# Patient Record
Sex: FEMALE | Race: WHITE | NOT HISPANIC OR LATINO | Employment: FULL TIME | URBAN - METROPOLITAN AREA
[De-identification: names, ages, dates, MRNs, and addresses within clinical notes are randomized per-mention and may not be internally consistent; named-entity substitution may affect disease eponyms.]

---

## 2017-02-06 ENCOUNTER — APPOINTMENT (EMERGENCY)
Dept: RADIOLOGY | Facility: HOSPITAL | Age: 37
End: 2017-02-06
Payer: COMMERCIAL

## 2017-02-06 ENCOUNTER — HOSPITAL ENCOUNTER (EMERGENCY)
Facility: HOSPITAL | Age: 37
Discharge: HOME | End: 2017-02-06
Attending: EMERGENCY MEDICINE | Admitting: EMERGENCY MEDICINE
Payer: COMMERCIAL

## 2017-02-06 VITALS
RESPIRATION RATE: 18 BRPM | BODY MASS INDEX: 30.73 KG/M2 | TEMPERATURE: 97.5 F | HEART RATE: 78 BPM | DIASTOLIC BLOOD PRESSURE: 75 MMHG | HEIGHT: 64 IN | WEIGHT: 180 LBS | OXYGEN SATURATION: 98 % | SYSTOLIC BLOOD PRESSURE: 122 MMHG

## 2017-02-06 DIAGNOSIS — M54.50 LOW BACK PAIN: Primary | ICD-10-CM

## 2017-02-06 LAB
ALBUMIN SERPL BCP-MCNC: 3.8 G/DL (ref 3.5–5)
ALP SERPL-CCNC: 89 U/L (ref 46–116)
ALT SERPL W P-5'-P-CCNC: 27 U/L (ref 12–78)
ANION GAP SERPL CALCULATED.3IONS-SCNC: 10 MMOL/L (ref 4–13)
AST SERPL W P-5'-P-CCNC: 18 U/L (ref 5–45)
BASOPHILS # BLD AUTO: 0.07 THOUSANDS/ΜL (ref 0–0.1)
BASOPHILS NFR BLD AUTO: 1 % (ref 0–1)
BILIRUB SERPL-MCNC: 0.6 MG/DL (ref 0.2–1)
BILIRUB UR QL STRIP: NEGATIVE
BUN SERPL-MCNC: 16 MG/DL (ref 5–25)
CALCIUM SERPL-MCNC: 9.1 MG/DL (ref 8.3–10.1)
CHLORIDE SERPL-SCNC: 100 MMOL/L (ref 100–108)
CLARITY UR: CLEAR
CO2 SERPL-SCNC: 28 MMOL/L (ref 21–32)
COLOR UR: YELLOW
CREAT SERPL-MCNC: 0.78 MG/DL (ref 0.6–1.3)
EOSINOPHIL # BLD AUTO: 0.02 THOUSAND/ΜL (ref 0–0.61)
EOSINOPHIL NFR BLD AUTO: 0 % (ref 0–6)
ERYTHROCYTE [DISTWIDTH] IN BLOOD BY AUTOMATED COUNT: 12.2 % (ref 11.6–15.1)
GFR SERPL CREATININE-BSD FRML MDRD: >60 ML/MIN/1.73SQ M
GLUCOSE SERPL-MCNC: 84 MG/DL (ref 65–140)
GLUCOSE UR STRIP-MCNC: NEGATIVE MG/DL
HCG UR QL: NEGATIVE
HCT VFR BLD AUTO: 39.7 % (ref 34.8–46.1)
HGB BLD-MCNC: 13.3 G/DL (ref 11.5–15.4)
HGB UR QL STRIP.AUTO: NEGATIVE
KETONES UR STRIP-MCNC: ABNORMAL MG/DL
LEUKOCYTE ESTERASE UR QL STRIP: NEGATIVE
LYMPHOCYTES # BLD AUTO: 2.2 THOUSANDS/ΜL (ref 0.6–4.47)
LYMPHOCYTES NFR BLD AUTO: 35 % (ref 14–44)
MCH RBC QN AUTO: 28.3 PG (ref 26.8–34.3)
MCHC RBC AUTO-ENTMCNC: 33.5 G/DL (ref 31.4–37.4)
MCV RBC AUTO: 85 FL (ref 82–98)
MONOCYTES # BLD AUTO: 0.46 THOUSAND/ΜL (ref 0.17–1.22)
MONOCYTES NFR BLD AUTO: 7 % (ref 4–12)
NEUTROPHILS # BLD AUTO: 3.46 THOUSANDS/ΜL (ref 1.85–7.62)
NEUTS SEG NFR BLD AUTO: 56 % (ref 43–75)
NITRITE UR QL STRIP: NEGATIVE
NRBC BLD AUTO-RTO: 0 /100 WBCS
PH UR STRIP.AUTO: 5.5 [PH] (ref 4.5–8)
PLATELET # BLD AUTO: 244 THOUSANDS/UL (ref 149–390)
PMV BLD AUTO: 10.1 FL (ref 8.9–12.7)
POTASSIUM SERPL-SCNC: 3.6 MMOL/L (ref 3.5–5.3)
PROT SERPL-MCNC: 8.1 G/DL (ref 6.4–8.2)
PROT UR STRIP-MCNC: NEGATIVE MG/DL
RBC # BLD AUTO: 4.7 MILLION/UL (ref 3.81–5.12)
SODIUM SERPL-SCNC: 138 MMOL/L (ref 136–145)
SP GR UR STRIP.AUTO: 1.02 (ref 1–1.03)
UROBILINOGEN UR QL STRIP.AUTO: 0.2 E.U./DL
WBC # BLD AUTO: 6.22 THOUSAND/UL (ref 4.31–10.16)

## 2017-02-06 PROCEDURE — 85025 COMPLETE CBC W/AUTO DIFF WBC: CPT | Performed by: EMERGENCY MEDICINE

## 2017-02-06 PROCEDURE — 80053 COMPREHEN METABOLIC PANEL: CPT | Performed by: EMERGENCY MEDICINE

## 2017-02-06 PROCEDURE — A9270 NON-COVERED ITEM OR SERVICE: HCPCS | Performed by: EMERGENCY MEDICINE

## 2017-02-06 PROCEDURE — 72100 X-RAY EXAM L-S SPINE 2/3 VWS: CPT

## 2017-02-06 PROCEDURE — 99283 EMERGENCY DEPT VISIT LOW MDM: CPT

## 2017-02-06 PROCEDURE — 36415 COLL VENOUS BLD VENIPUNCTURE: CPT | Performed by: EMERGENCY MEDICINE

## 2017-02-06 PROCEDURE — 81003 URINALYSIS AUTO W/O SCOPE: CPT | Performed by: EMERGENCY MEDICINE

## 2017-02-06 PROCEDURE — 81025 URINE PREGNANCY TEST: CPT | Performed by: EMERGENCY MEDICINE

## 2017-02-06 RX ORDER — NAPROXEN 250 MG/1
500 TABLET ORAL ONCE
Status: COMPLETED | OUTPATIENT
Start: 2017-02-06 | End: 2017-02-06

## 2017-02-06 RX ORDER — NAPROXEN 500 MG/1
500 TABLET ORAL 2 TIMES DAILY WITH MEALS
Qty: 14 TABLET | Refills: 0 | Status: SHIPPED | OUTPATIENT
Start: 2017-02-06 | End: 2018-10-11

## 2017-02-06 RX ADMIN — NAPROXEN 500 MG: 250 TABLET ORAL at 07:19

## 2017-07-26 ENCOUNTER — TRANSCRIBE ORDERS (OUTPATIENT)
Dept: ADMINISTRATIVE | Facility: HOSPITAL | Age: 37
End: 2017-07-26

## 2017-07-26 ENCOUNTER — APPOINTMENT (OUTPATIENT)
Dept: LAB | Facility: HOSPITAL | Age: 37
End: 2017-07-26
Payer: COMMERCIAL

## 2017-07-26 DIAGNOSIS — Z00.8 HEALTH EXAMINATION IN POPULATION SURVEY: Primary | ICD-10-CM

## 2017-07-26 DIAGNOSIS — Z00.8 HEALTH EXAMINATION IN POPULATION SURVEY: ICD-10-CM

## 2017-07-26 LAB
CHOLEST SERPL-MCNC: 179 MG/DL (ref 50–200)
EST. AVERAGE GLUCOSE BLD GHB EST-MCNC: 114 MG/DL
HBA1C MFR BLD: 5.6 % (ref 4.2–6.3)
HDLC SERPL-MCNC: 52 MG/DL (ref 40–60)
LDLC SERPL CALC-MCNC: 108 MG/DL (ref 0–100)
TRIGL SERPL-MCNC: 96 MG/DL

## 2017-07-26 PROCEDURE — 36415 COLL VENOUS BLD VENIPUNCTURE: CPT

## 2017-07-26 PROCEDURE — 83036 HEMOGLOBIN GLYCOSYLATED A1C: CPT

## 2017-07-26 PROCEDURE — 80061 LIPID PANEL: CPT

## 2017-08-15 ENCOUNTER — ALLSCRIPTS OFFICE VISIT (OUTPATIENT)
Dept: OTHER | Facility: OTHER | Age: 37
End: 2017-08-15

## 2017-12-20 ENCOUNTER — ALLSCRIPTS OFFICE VISIT (OUTPATIENT)
Dept: OTHER | Facility: OTHER | Age: 37
End: 2017-12-20

## 2017-12-20 DIAGNOSIS — R53.81 OTHER MALAISE: ICD-10-CM

## 2017-12-20 DIAGNOSIS — F41.9 ANXIETY DISORDER: ICD-10-CM

## 2017-12-20 DIAGNOSIS — M25.50 PAIN IN JOINT: ICD-10-CM

## 2017-12-20 DIAGNOSIS — R53.83 OTHER FATIGUE: ICD-10-CM

## 2017-12-21 NOTE — PROGRESS NOTES
Assessment   1  Arthralgia of multiple joints (719 49) (M25 50)   2  Malaise and fatigue (780 79) (R53 81,R53 83)   3  Anxiety (300 00) (F41 9)    Plan   Anxiety    · Escitalopram Oxalate 10 MG Oral Tablet (Lexapro); take 1 tablet every day  Anxiety, Arthralgia of multiple joints, Malaise and fatigue    · (1) SHIRAZ SCREEN W/REFLEX TO TITER/PATTERN; Status:Active; Requested    for:13Gxw3498;    · (1) CBC/PLT/DIFF; Status:Active; Requested for:14Ams9798;    · (1) COMPREHENSIVE METABOLIC PANEL; Status:Active; Requested for:97Vwr2092;    · (1) C-REACTIVE PROTEIN; Status:Active; Requested for:09Ciq8948;    · (1) JONNA BARR VIRUS; Status:Active; Requested for:90Wsz3488;    · (1) LYME ANTIBODY PROFILE W/REFLEX TO WESTERN BLOT; Status:Active; Requested    for:98Fpf3873;    · (1) RHEUMATOID FACTOR SCREEN; Status:Active; Requested for:71Rbl5207;    · (1) SED RATE; Status:Active; Requested for:58Mdc7479;    · (1) T3 TOTAL; Status:Active; Requested for:25Iwy7601;    · (1) T4, FREE; Status:Active; Requested for:55Ryx7250;    · (1) TSH; Status:Active; Requested for:31Mwg6739;     Discussion/Summary      Lexapro is renewed  Patient is to have lab work done as ordered for her fatigue  We will follow up after labs are done  The patient was counseled regarding instructions for management,-- risk factor reductions,-- impressions,-- risks and benefits of treatment options,-- importance of compliance with treatment  Possible side effects of new medications were reviewed with the patient/guardian today  The treatment plan was reviewed with the patient/guardian  The patient/guardian understands and agrees with the treatment plan       Self Referrals: No      Chief Complaint   Pt in office today for a f/u on medications and is asking for a blood order  Pt reports fatigue and joint pain  Patient is here today for follow up of chronic conditions described in HPI        History of Present Illness   30year-old here for follow-up of her anxiety and medication  She feels she is doing well on the Lexapro  She would like to continue  She does note that she is having more fatigue than usual  She works night shifts and knows what her normal tired is in this is just so much worse  She has joint achiness along with the fatigue  The patient is being seen for follow-up of generalized anxiety disorder  The patient reports doing well  She has no comorbid illnesses  The patient is currently asymptomatic  Medications:  the patient is adherent to her medication regimen, but-- she denies medication side effects  The patient is being seen for an initial evaluation of fatigue  Symptoms:  fatigue, but-- no generalized weakness  The patient is currently experiencing symptoms  Associated symptoms:  poor sleep,-- somnolence,-- anxiety-- and-- arthralgias, but-- no cognitive impairment,-- no depressed mood,-- no myalgias,-- no fever,-- no swollen glands,-- no cough,-- no dyspnea,-- no irregular heartbeat,-- no chest pain,-- no abdominal pain,-- no nausea,-- no vomiting,-- no diarrhea,-- no constipation,-- no menstrual irregularity,-- no change in weight,-- no heat intolerance,-- no cold intolerance,-- no polyuria-- and-- no polydipsia  Review of Systems        Constitutional: as noted in HPI       ENT: no complaints of earache, no loss of hearing, no nose bleeds, no nasal discharge, no sore throat, no hoarseness  Cardiovascular: No complaints of slow heart rate, no fast heart rate, no chest pain, no palpitations, no leg claudication, no lower extremity edema  Respiratory: No complaints of shortness of breath, no wheezing, no cough, no SOB on exertion, no orthopnea, no PND  Gastrointestinal: No complaints of abdominal pain, no constipation, no nausea or vomiting, no diarrhea, no bloody stools  Musculoskeletal: as noted in HPI        Integumentary: No complaints of skin rash or lesions, no itching, no skin wounds, no breast pain or lump       Neurological: No complaints of headache, no confusion, no convulsions, no numbness, no dizziness or fainting, no tingling, no limb weakness, no difficulty walking  Psychiatric: as noted in HPI  ROS reviewed  Active Problems   1  Anxiety (300 00) (F41 9)   2  Mild cervical dysplasia (622 11) (N87 0)    Past Medical History   1  History of Diet controlled gestational diabetes mellitus in third trimester (648 83)     (O24 410)   2  History of Encounter for postpartum assessment (V24 2) (Z39 2)   3  History of Encounter for supervision of other normal pregnancy in third trimester (V22 1)     (Z34 83)   4  History of Gestational diabetes mellitus, class A1 (648 80) (O24 410)   5  History of female infertility (V13 29) (Z87 42)   6  History of HPV test positive (796 9,079 4)   7  History of Need for Tdap vaccination (V06 1) (Z23)   8  Normal delivery (650) (O80,Z37 9)   9  History of Obesity complicating pregnancy, childbirth, or puerperium, antepartum     (649 13,278 00) (O99 210,E66 9)   10  History of Pregnancy complicated by obesity (289 81) (O99 210)     The active problems and past medical history were reviewed and updated today  Surgical History   1  History of Colposcopy Cervix With Biopsy(S)   2  History of Hysteroscopy With Bilateral Fallopian Tube Cannulation   3  History of Oral Surgery Tooth Extraction     The surgical history was reviewed and updated today  Family History   Father    1  Family history of Alcoholism  Maternal Grandmother    2  Family history of malignant neoplasm of uterus (V16 49) (Z80 49)  Paternal Grandmother    3  Family history of Breast Cancer (V16 3)  Paternal Grandfather    4  Family history of Liver Cancer  Paternal Aunt    11  Family history of Alcoholism  Maternal Uncle    6  Family history of Type 2 Diabetes Mellitus - Uncomplicated, Controlled  Family History    7   Family history of Reported Family History Of Heart Disease     The family history was reviewed and updated today  Social History    · Being A Social Drinker   · Full-time employment   · Marital History - Currently    · Never A Smoker   · Uses Safety Equipment - Seatbelts  The social history was reviewed and is unchanged  Current Meds    1  Escitalopram Oxalate 10 MG Oral Tablet; take 1 tablet every day; Therapy: 53JMJ2565 to (Evaluate:91Xhl2410)  Requested for: 04DMI0857; Last     Rx:12Oct2017; Status: ACTIVE - Renewal Denied Ordered     The medication list was reviewed and updated today  Allergies   1  No Known Drug Allergies  2  No Known Environmental Allergies   3  No Known Food Allergies    Vitals   Vital Signs    Recorded: 20Dec2017 10:28AM   Heart Rate 80   Systolic 959   Diastolic 88   Height 5 ft 4 in   Weight 182 lb    BMI Calculated 31 24   BSA Calculated 1 88   O2 Saturation 99     Physical Exam        Constitutional      General appearance: No acute distress, well appearing and well nourished  Ears, Nose, Mouth, and Throat      External inspection of ears and nose: Normal        Otoscopic examination: Tympanic membranes translucent with normal light reflex  Canals patent without erythema  Oropharynx: Normal with no erythema, edema, exudate or lesions  Pulmonary      Respiratory effort: No increased work of breathing or signs of respiratory distress  Auscultation of lungs: Clear to auscultation  Cardiovascular      Auscultation of heart: Normal rate and rhythm, normal S1 and S2, without murmurs  Carotid pulses: Normal        Lymphatic      Palpation of lymph nodes in neck: No lymphadenopathy  Musculoskeletal      Gait and station: Normal        Inspection/palpation of joints, bones, and muscles: Normal        Psychiatric      Orientation to person, place, and time: Normal        Mood and affect: Normal           Health Management   Mild cervical dysplasia   (1) THIN PREP PAP WITH IMAGING; every 6 months;  Last 97Vkr9873; Next Due: 51BTS8359;     Overdue    Signatures    Electronically signed by : TULIO Vazquez; Dec 20 2017 10:57AM EST                       (Author)     Electronically signed by : HADLEY Oliva ; Dec 21 2017 12:04AM EST                           Electronically signed by : HADLEY Oliva ; Dec 21 2017 12:04AM EST                           Electronically signed by : HADLEY Oliva ; Dec 21 2017 12:04AM EST                           Electronically signed by : HADLEY Oliva ; Dec 21 2017 12:04AM EST                           Electronically signed by : HADLEY Oliva ; Dec 21 2017 12:04AM EST                           Electronically signed by : HADLEY Oliva ; Dec 21 2017 12:42AM EST

## 2017-12-22 ENCOUNTER — TRANSCRIBE ORDERS (OUTPATIENT)
Dept: ADMINISTRATIVE | Facility: HOSPITAL | Age: 37
End: 2017-12-22

## 2017-12-22 ENCOUNTER — APPOINTMENT (OUTPATIENT)
Dept: LAB | Facility: HOSPITAL | Age: 37
End: 2017-12-22
Payer: COMMERCIAL

## 2017-12-22 DIAGNOSIS — F41.9 ANXIETY DISORDER: ICD-10-CM

## 2017-12-22 DIAGNOSIS — R53.83 OTHER FATIGUE: ICD-10-CM

## 2017-12-22 DIAGNOSIS — M25.50 PAIN IN JOINT: ICD-10-CM

## 2017-12-22 DIAGNOSIS — R53.81 OTHER MALAISE: ICD-10-CM

## 2017-12-22 LAB
ALBUMIN SERPL BCP-MCNC: 3.8 G/DL (ref 3.5–5)
ALP SERPL-CCNC: 82 U/L (ref 46–116)
ALT SERPL W P-5'-P-CCNC: 38 U/L (ref 12–78)
ANION GAP SERPL CALCULATED.3IONS-SCNC: 9 MMOL/L (ref 4–13)
AST SERPL W P-5'-P-CCNC: 20 U/L (ref 5–45)
BASOPHILS # BLD AUTO: 0.1 THOUSANDS/ΜL (ref 0–0.1)
BASOPHILS NFR BLD AUTO: 2 % (ref 0–1)
BILIRUB SERPL-MCNC: 0.4 MG/DL (ref 0.2–1)
BUN SERPL-MCNC: 20 MG/DL (ref 5–25)
CALCIUM SERPL-MCNC: 9.3 MG/DL (ref 8.3–10.1)
CHLORIDE SERPL-SCNC: 101 MMOL/L (ref 100–108)
CO2 SERPL-SCNC: 30 MMOL/L (ref 21–32)
CREAT SERPL-MCNC: 0.72 MG/DL (ref 0.6–1.3)
CRP SERPL QL: 4.3 MG/L
EOSINOPHIL # BLD AUTO: 0.06 THOUSAND/ΜL (ref 0–0.61)
EOSINOPHIL NFR BLD AUTO: 1 % (ref 0–6)
ERYTHROCYTE [DISTWIDTH] IN BLOOD BY AUTOMATED COUNT: 12.1 % (ref 11.6–15.1)
ERYTHROCYTE [SEDIMENTATION RATE] IN BLOOD: 32 MM/HOUR (ref 0–20)
GFR SERPL CREATININE-BSD FRML MDRD: 109 ML/MIN/1.73SQ M
GLUCOSE P FAST SERPL-MCNC: 96 MG/DL (ref 65–99)
HCT VFR BLD AUTO: 38.6 % (ref 34.8–46.1)
HGB BLD-MCNC: 13 G/DL (ref 11.5–15.4)
LYMPHOCYTES # BLD AUTO: 2.86 THOUSANDS/ΜL (ref 0.6–4.47)
LYMPHOCYTES NFR BLD AUTO: 44 % (ref 14–44)
MCH RBC QN AUTO: 28.8 PG (ref 26.8–34.3)
MCHC RBC AUTO-ENTMCNC: 33.7 G/DL (ref 31.4–37.4)
MCV RBC AUTO: 86 FL (ref 82–98)
MONOCYTES # BLD AUTO: 0.91 THOUSAND/ΜL (ref 0.17–1.22)
MONOCYTES NFR BLD AUTO: 14 % (ref 4–12)
NEUTROPHILS # BLD AUTO: 2.49 THOUSANDS/ΜL (ref 1.85–7.62)
NEUTS SEG NFR BLD AUTO: 38 % (ref 43–75)
NRBC BLD AUTO-RTO: 0 /100 WBCS
PLATELET # BLD AUTO: 283 THOUSANDS/UL (ref 149–390)
PMV BLD AUTO: 9.7 FL (ref 8.9–12.7)
POTASSIUM SERPL-SCNC: 4 MMOL/L (ref 3.5–5.3)
PROT SERPL-MCNC: 8.4 G/DL (ref 6.4–8.2)
RBC # BLD AUTO: 4.51 MILLION/UL (ref 3.81–5.12)
SODIUM SERPL-SCNC: 140 MMOL/L (ref 136–145)
T3 SERPL-MCNC: 1.2 NG/ML (ref 0.6–1.8)
T4 FREE SERPL-MCNC: 0.97 NG/DL (ref 0.76–1.46)
TSH SERPL DL<=0.05 MIU/L-ACNC: 1.68 UIU/ML (ref 0.36–3.74)
WBC # BLD AUTO: 6.51 THOUSAND/UL (ref 4.31–10.16)

## 2017-12-22 PROCEDURE — 80053 COMPREHEN METABOLIC PANEL: CPT

## 2017-12-22 PROCEDURE — 36415 COLL VENOUS BLD VENIPUNCTURE: CPT

## 2017-12-22 PROCEDURE — 84439 ASSAY OF FREE THYROXINE: CPT

## 2017-12-22 PROCEDURE — 86618 LYME DISEASE ANTIBODY: CPT

## 2017-12-22 PROCEDURE — 86140 C-REACTIVE PROTEIN: CPT

## 2017-12-22 PROCEDURE — 84480 ASSAY TRIIODOTHYRONINE (T3): CPT

## 2017-12-22 PROCEDURE — 86665 EPSTEIN-BARR CAPSID VCA: CPT

## 2017-12-22 PROCEDURE — 86038 ANTINUCLEAR ANTIBODIES: CPT

## 2017-12-22 PROCEDURE — 86430 RHEUMATOID FACTOR TEST QUAL: CPT

## 2017-12-22 PROCEDURE — 85025 COMPLETE CBC W/AUTO DIFF WBC: CPT

## 2017-12-22 PROCEDURE — 85652 RBC SED RATE AUTOMATED: CPT

## 2017-12-22 PROCEDURE — 86664 EPSTEIN-BARR NUCLEAR ANTIGEN: CPT

## 2017-12-22 PROCEDURE — 86663 EPSTEIN-BARR ANTIBODY: CPT

## 2017-12-22 PROCEDURE — 84443 ASSAY THYROID STIM HORMONE: CPT

## 2017-12-23 LAB
EBV EA IGG SER-ACNC: 21.8 U/ML (ref 0–8.9)
EBV NA IGG SER IA-ACNC: 38.8 U/ML (ref 0–17.9)
EBV PATRN SPEC IB-IMP: ABNORMAL
EBV VCA IGG SER IA-ACNC: 170 U/ML (ref 0–17.9)
EBV VCA IGM SER IA-ACNC: <36 U/ML (ref 0–35.9)

## 2017-12-26 LAB
B BURGDOR IGG SER IA-ACNC: 0.22
B BURGDOR IGM SER IA-ACNC: 0.38
RHEUMATOID FACT SER QL LA: NEGATIVE

## 2017-12-27 LAB — RYE IGE QN: NEGATIVE

## 2018-01-10 NOTE — PROGRESS NOTES
Discussion/Summary  Discussion Summary:   Please refer to the ultrasound report for additional information  Counseling Documentation With Imm: The patient was counseled regarding diagnostic results, instructions for management, prognosis, impressions  Chief Complaint  Chief Complaint Free Text Note Form: Here for ultrasound study      History of Present Illness  HPI: Please refer to the ultrasound report for additional information  Active Problems    1  Encounter for supervision of other normal pregnancy in third trimester (V22 1) (Z34 83)   2  Gestational diabetes mellitus, class A1 (648 80) (O24 410)   3  Mild cervical dysplasia (622 11) (N87 0)   4  Pregnancy complicated by obesity (864 77,449 77) (O99 210,E66 9)    Past Medical History    1  History of female infertility (V13 29) (Z87 42)   2  History of HPV test positive (796 9,079 4) (R88 8,B97 7)   3  Normal delivery (650) (O80,Z37 9)   4  History of Obesity complicating pregnancy, childbirth, or puerperium, antepartum   (649 13,278 00) (O99 210,E66 9)    Surgical History    1  History of Colposcopy Cervix With Biopsy(S)   2  History of Hysteroscopy With Bilateral Fallopian Tube Cannulation   3  History of Oral Surgery Tooth Extraction    Family History    1  Family history of Alcoholism    2  Family history of malignant neoplasm of uterus (V16 49) (Z80 49)    3  Family history of Breast Cancer (V16 3)    4  Family history of Liver Cancer    5  Family history of Alcoholism    6  Family history of Type 2 Diabetes Mellitus - Uncomplicated, Controlled    7  Family history of Reported Family History Of Heart Disease    Social History    · Being A Social Drinker   · Full-time employment   · Marital History - Currently    · Never A Smoker   · Uses Safety Equipment - Seatbelts    Current Meds   1  Karlo Contour Next Monitor w/Device Kit; USE AS DIRECTED; Therapy: 72JCV8230 to (Evaluate:88Szk1955)  Requested for: 21Jan2014;  Last   LA:71JUR3760 Ordered   2  Karlo Contour Next Test In [employer]; USE 1 STRIP 4 TIMES DAILY; Therapy: 58CMX3638 to (Evaluate:2016)  Requested for: 12YUS4000; Last   Rx:59Ntn3478 Ordered   3  Breast Pump Miscellaneous; USE AS DIRECTED; Therapy: 05Jiu5359 to Recorded   4  Lancets Miscellaneous; TEST 4 TIMES DAILY; Therapy: 26GXC4119 to (Evaluate:41Ieb3311)  Requested for: 2014; Last   Rx:2014 Ordered   5  Prenatal 28-0 8 MG Oral Tablet; TAKE 1 TABLET DAILY; Therapy: 06Jts0091 to Recorded    Allergies    1  No Known Drug Allergies    2  No Known Environmental Allergies   3  No Known Food Allergies    Vitals  Vital Signs [Data Includes: Current Encounter]    Recorded: 62JHL2610 27:32VT   Systolic 376, LUE, Sitting   Diastolic 75, LUE, Sitting   BP Cuff Size Large   Height 5 ft 4 in   Weight 187 lb 3 2 oz   BMI Calculated 32 13   BSA Calculated 1 9   Pain Scale 0     Results/Data  Obstetrical Ultrasound: Exam description: level I obstetrical ultrasound  Findings: no fetal abnormalities seen  Health Management  Mild cervical dysplasia   (1) THIN PREP PAP WITH IMAGING; every 6 months; Last 2013; Next Due: 53YNP3515; Overdue    Future Appointments    Date/Time Provider Specialty Site   2016 02:20 PM HADLEY Chris  Obstetrics/Gynecology [facility]   2016 02:40 PM HADLEY Chris  Obstetrics/Gynecology [facility]   2016 01:30 PM  [address] [address], [facility]   2016 03:30 PM Ronit Suggs MD Obstetrics/Gynecology [facility]   2016 03:00 PM Ronit Suggs MD Obstetrics/Gynecology [facility]   2016 03:00 PM Ronit Suggs MD Obstetrics/Gynecology [facility]   2016 02:40 PM HADLEY Bo   Obstetrics/Gynecology [facility]     Signatures   Electronically signed by : HADLEY Blake ;  2016  2:02PM EST                       (Author)

## 2018-01-11 NOTE — MISCELLANEOUS
Message  Return to work or school:   Vanna Gilford is under my professional care  She was seen in my office on 4/15/16   She is able to return to work on  5/23/16    She is able to perform activities of daily living without limitations  Weight Bearing Status: Weight-Bearing As Tolerated  Letty Hardwick may return to work with no restrictions at this time          Signatures   Electronically signed by : Colleen Dominguez, ; Jun 1 2016 11:00AM EST                       (/Recorder)

## 2018-01-13 VITALS
HEIGHT: 64 IN | SYSTOLIC BLOOD PRESSURE: 112 MMHG | BODY MASS INDEX: 30.77 KG/M2 | WEIGHT: 180.25 LBS | OXYGEN SATURATION: 98 % | DIASTOLIC BLOOD PRESSURE: 82 MMHG | HEART RATE: 113 BPM

## 2018-01-15 NOTE — CONSULTS
I had the pleasure of evaluating your patient, Sanam Shelley  My full evaluation follows:      Chief Complaint  Here for ultrasound study      History of Present Illness  Please refer to the ultrasound report for additional information  Active Problems    1  Encounter for supervision of other normal pregnancy in third trimester (V22 1) (Z34 83)   2  Gestational diabetes mellitus, class A1 (648 80) (O24 410)   3  Mild cervical dysplasia (622 11) (N87 0)   4  Pregnancy complicated by obesity (686 98,056 91) (O99 210,E66 9)    Past Medical History    · History of female infertility (V13 29) (Z87 42)   · History of HPV test positive (796 9,079 4) (R88 8,B97 7)   · Normal delivery (650) (O80,Z37  9)   · History of Obesity complicating pregnancy, childbirth, or puerperium, antepartum  (649 13,278 00) (O99 210,E66 9)    Surgical History    · History of Colposcopy Cervix With Biopsy(S)   · History of Hysteroscopy With Bilateral Fallopian Tube Cannulation   · History of Oral Surgery Tooth Extraction    Family History    · Family history of Alcoholism    · Family history of malignant neoplasm of uterus (V16 49) (Z80 49)    · Family history of Breast Cancer (V16 3)    · Family history of Liver Cancer    · Family history of Alcoholism    · Family history of Type 2 Diabetes Mellitus - Uncomplicated, Controlled    · Family history of Reported Family History Of Heart Disease    Social History    · Being A Social Drinker   · Full-time employment   · Marital History - Currently    · Never A Smoker   · Uses Safety Equipment - Seatbelts    Current Meds   1  Karlo Contour Next Monitor w/Device Kit; USE AS DIRECTED; Therapy: 29OYL7758 to (Evaluate:23Xbi0036)  Requested for: 21Jan2014; Last   Rx:21Jan2014 Ordered   2  Karlo Contour Next Test In [employer]; USE 1 STRIP 4 TIMES DAILY; Therapy: 06ZPK0681 to (Evaluate:22Apr2016)  Requested for: 35QXV9106; Last   Rx:05Oct2015 Ordered   3   Breast Pump Miscellaneous; USE AS DIRECTED; Therapy: 08Aug2014 to Recorded   4  Lancets Miscellaneous; TEST 4 TIMES DAILY; Therapy: 76FME7554 to (Evaluate:09Aug2014)  Requested for: 21Jan2014; Last   Rx:21Jan2014 Ordered   5  Prenatal 28-0 8 MG Oral Tablet; TAKE 1 TABLET DAILY; Therapy: 23Dec2013 to Recorded    Allergies    1  No Known Drug Allergies    2  No Known Environmental Allergies   3  No Known Food Allergies    Vitals   Recorded: 26JWC9141 74:52LR   Systolic 105, LUE, Sitting   Diastolic 75, LUE, Sitting   BP Cuff Size Large   Height 5 ft 4 in   Weight 187 lb 3 2 oz   BMI Calculated 32 13   BSA Calculated 1 9   Pain Scale 0     Results/Data  Exam description: level I obstetrical ultrasound  Findings: no fetal abnormalities seen  Discussion/Summary    Please refer to the ultrasound report for additional information  The patient was counseled regarding diagnostic results, instructions for management, prognosis, impressions  Health Management   (1) THIN PREP PAP WITH IMAGING; every 6 months; Last 28Aug2013; Next Due: 93ZDA7901; Overdue    Thank you very much for allowing me to participate in the care of this patient  If you have any questions, please do not hesitate to contact me        Future Appointments    Signatures   Electronically signed by : HADLEY Madison ; Jan 29 2016  2:02PM EST                       (Author)

## 2018-01-16 NOTE — PROGRESS NOTES
2016         RE: Monet Cabrera                               To: [facility]  MR#: [id number]                                   [address]   : [address]   ENC: [id number]                             Maximino, [address]   (Exam #: [id number])                           Fax: [phone]      The LMP of this 37year old,  3, para 2 patient was unknown, her   working ALFRED is MAR 20 2016 and the current gestational age is 42 weeks 4   days by 1st Trimester Sono  A sonographic examination was performed on 2016 using real time equipment  The ultrasound examination was   performed using abdominal technique  The patient has a BMI of 32 4  Her   blood pressure today was 124/83  Earliest ultrasound found in her record: 9/4/15   11w5d  3/20/16 ALFRED                  Cardiac motion was observed at 148 bpm       INDICATIONS      diabetes, gestational, A1      Exam Types      Level I      RESULTS      Fetus # 1 of 1   Vertex presentation   Fetal growth appeared normal   Placenta Location = Left lateral   No placenta previa   Placenta Grade = II      MEASUREMENTS (* Included In Average GA)      OFD             11 2 cm   AC              33 6 cm        37 weeks 5 days* (71%)   BPD              8 9 cm        35 weeks 6 days* (43%)   HC              31 9 cm        35 weeks 3 days* (23%)   Femur            6 7 cm        34 weeks 0 days* (15%)      HC/AC           0 95   FL/AC           0 20   FL/BPD          0 75   EFW (Ac/Fl/Hc)  2897 grams - 6 lbs 6 oz                 (43%)      THE AVERAGE GESTATIONAL AGE is 35 weeks 6 days +/- 21 days        AMNIOTIC FLUID      Q1: 3 8      Q2: 4 4      Q3: 4 7      Q4: 4 1   JAIDEN Total = 17 0 cm   Amniotic Fluid: Normal      FETAL VESSELS                                     S/D   PI    RI    PSV   AEDV RF                                                    cm/s       Umbilical Artery 2  01  0 70  0 50        No   No      ANATOMY COMMENTS      Fetal anatomy has been previously documented; no anomalies were   identified  No fetal structural abnormality is identified on the Level I   survey today  Follow up anatomy of the lateral ventricles, 4 chamber view,   outflow tracts, diaphragm,  kidneys, stomach and bladder appear normal    Fetal interval growth and amniotic fluid volume are normal  The umbilical   artery dopplers are normal for gestational age  IMPRESSION      Way IUP   35 weeks and 6 days by this ultrasound  (ALFRED=MAR 25 2016)   36 weeks and 4 days by 1st Tri Sono  (ALFRED=MAR 20 2016)   Vertex presentation   Fetal growth appeared normal   Regular fetal heart rate of 148 bpm   Left lateral placenta   No placenta previa      MAURICIO Valenzuela M D  Maternal-Fetal Medicine   Electronically signed 03/02/16 12:28           Electronically signed by:Aaron CHOW    Mar  2 2016  2:02PM EST

## 2018-01-16 NOTE — RESULT NOTES
Verified Results  (1) RUBELLA IMMUNITY [id number] 01:05PM Licha Keenan     Test Name Result Flag Reference   RUBELLA (IGG) 27 8 IU/mL  >9 9   Rubella IgG       <5 0 IU/mL     Negative: not immune      5 0-9 9 IU/mL  Equivocal     >9 9 IU/mL     Positive: immune     (1) RUBEOLA ANTIBODIES, IGG [id number] 01:05PM Licha Keenan     Test Name Result Flag Reference   Rubeola AB, IgG NON-IMMUNE A IMMUNE   Interpretation:    IMMUNE     - Presumed immune to Measles infection  EQUIVOCAL  - Suggest repeat in 2-4 weeks  NON-IMMUNE - Presumed non-immune to Measles infection

## 2018-01-23 VITALS
WEIGHT: 182 LBS | HEART RATE: 80 BPM | BODY MASS INDEX: 31.07 KG/M2 | DIASTOLIC BLOOD PRESSURE: 88 MMHG | OXYGEN SATURATION: 99 % | HEIGHT: 64 IN | SYSTOLIC BLOOD PRESSURE: 138 MMHG

## 2018-02-20 ENCOUNTER — OFFICE VISIT (OUTPATIENT)
Dept: FAMILY MEDICINE CLINIC | Facility: CLINIC | Age: 38
End: 2018-02-20
Payer: COMMERCIAL

## 2018-02-20 VITALS
DIASTOLIC BLOOD PRESSURE: 72 MMHG | HEIGHT: 64 IN | BODY MASS INDEX: 31.76 KG/M2 | WEIGHT: 186 LBS | HEART RATE: 88 BPM | OXYGEN SATURATION: 98 % | SYSTOLIC BLOOD PRESSURE: 110 MMHG | TEMPERATURE: 98.8 F

## 2018-02-20 DIAGNOSIS — M25.50 ARTHRALGIA, UNSPECIFIED JOINT: ICD-10-CM

## 2018-02-20 DIAGNOSIS — R53.83 MALAISE AND FATIGUE: Primary | ICD-10-CM

## 2018-02-20 DIAGNOSIS — R53.83 FATIGUE, UNSPECIFIED TYPE: ICD-10-CM

## 2018-02-20 DIAGNOSIS — M25.531 ARTHRALGIA OF RIGHT WRIST: ICD-10-CM

## 2018-02-20 DIAGNOSIS — R53.81 MALAISE AND FATIGUE: Primary | ICD-10-CM

## 2018-02-20 PROBLEM — F41.9 ANXIETY: Status: ACTIVE | Noted: 2017-08-15

## 2018-02-20 PROCEDURE — 99213 OFFICE O/P EST LOW 20 MIN: CPT | Performed by: PHYSICIAN ASSISTANT

## 2018-02-20 PROCEDURE — 3008F BODY MASS INDEX DOCD: CPT | Performed by: PHYSICIAN ASSISTANT

## 2018-02-20 RX ORDER — ESCITALOPRAM OXALATE 10 MG/1
1 TABLET ORAL DAILY
COMMUNITY
Start: 2017-08-15 | End: 2018-06-23 | Stop reason: SDUPTHER

## 2018-02-20 NOTE — PROGRESS NOTES
Assessment/Plan:       Diagnoses and all orders for this visit:    Malaise and fatigue  -     Hemoglobin A1c    Arthralgia, unspecified joint    Fatigue, unspecified type  -     CBC and differential  -     RF Screen w/ Reflex to Titer  -     C-reactive protein  -     SHIRAZ Screen w/ Reflex to Titer/Pattern    Arthralgia of right wrist  -     CBC and differential  -     RF Screen w/ Reflex to Titer  -     C-reactive protein  -     SHIRAZ Screen w/ Reflex to Titer/Pattern            Subjective:      Patient ID: Jalyn Driscoll is a 30 y o  female  Fatigue  Patient complains of fatigue  Symptoms began a week ago  The patient feels the fatigue began with: symptoms of arthritis  Symptoms of her fatigue have been hypersomnolence  Patient describes the following psychological symptoms: none  Patient denies change in hair texture, constipation, excessive menstrual bleeding, fever and unusual rashes  Symptoms have gradually worsened  Symptom severity: struggles to carry out day to day responsibilities    Previous visits for this problem: yes, last seen 1 month ago by me  The following portions of the patient's history were reviewed and updated as appropriate:   She has a past medical history of Cervical dysplasia; Female infertility; GDM (gestational diabetes mellitus), class A1; Gestational diabetes mellitus in pregnancy; HPV in female; Hypoplasia; Normal delivery; Varicella; and Visual impairment  ,   does not have any pertinent problems on file  ,   has a past surgical history that includes Colposcopy; [place] tooth extraction; and Hysteroscopy (Bilateral, 10/2013)  ,  family history includes Alcohol abuse in her father and paternal aunt; Breast cancer in her paternal grandmother; Diabetes type II in her maternal uncle; Heart disease in her family and maternal grandfather; Liver cancer in her paternal grandfather; Uterine cancer in her maternal grandmother  ,   reports that she has never smoked   She has never used smokeless tobacco  She reports that she does not drink alcohol or use drugs  ,  has No Known Allergies     Current Outpatient Prescriptions   Medication Sig Dispense Refill    escitalopram (LEXAPRO) 10 mg tablet Take 1 tablet by mouth daily      naproxen (NAPROSYN) 500 mg tablet Take 1 tablet by mouth 2 (two) times a day with meals for 7 days 14 tablet 0    Prenatal MV-Min-Fe Fum-FA-DHA (PRENATAL 1 PO) Take 1 tablet by mouth daily  No current facility-administered medications for this visit  Review of Systems   Constitutional: Positive for fatigue  Negative for appetite change, chills and fever  Respiratory: Negative for cough, chest tightness and shortness of breath  Cardiovascular: Negative for chest pain, palpitations and leg swelling  Gastrointestinal: Negative for abdominal pain  Endocrine: Positive for polydipsia  Genitourinary: Negative for difficulty urinating  Musculoskeletal: Positive for arthralgias  Neurological: Negative for dizziness, light-headedness, numbness and headaches  Psychiatric/Behavioral: Negative  Objective:  Vitals:    02/20/18 1034   BP: 110/72   Pulse: 88   Temp: 98 8 °F (37 1 °C)   SpO2: 98%      Physical Exam   Constitutional: She is oriented to person, place, and time  She appears well-developed and well-nourished  HENT:   Head: Normocephalic and atraumatic  Right Ear: External ear normal    Left Ear: External ear normal    Nose: Nose normal    Mouth/Throat: Oropharynx is clear and moist    Eyes: Conjunctivae are normal    Neck: Normal range of motion  Neck supple  No thyromegaly present  Cardiovascular: Normal rate, regular rhythm, normal heart sounds and intact distal pulses  Pulmonary/Chest: Effort normal and breath sounds normal    Musculoskeletal: Normal range of motion  She exhibits no edema, tenderness or deformity  Lymphadenopathy:     She has no cervical adenopathy     Neurological: She is alert and oriented to person, place, and time  Skin: Skin is warm and dry  Psychiatric: She has a normal mood and affect  Her behavior is normal    Nursing note and vitals reviewed

## 2018-02-20 NOTE — PATIENT INSTRUCTIONS
Patient is to have blood work done    At this time I am also going to check a hemoglobin A1c since she has had gestational diabetes in the past   If all of these are normal then would consider sending her to see rheumatologist

## 2018-02-21 ENCOUNTER — APPOINTMENT (OUTPATIENT)
Dept: LAB | Facility: CLINIC | Age: 38
End: 2018-02-21
Payer: COMMERCIAL

## 2018-02-21 LAB
BASOPHILS # BLD AUTO: 0.07 THOUSANDS/ΜL (ref 0–0.1)
BASOPHILS NFR BLD AUTO: 1 % (ref 0–1)
CRP SERPL QL: <3 MG/L
EOSINOPHIL # BLD AUTO: 0.04 THOUSAND/ΜL (ref 0–0.61)
EOSINOPHIL NFR BLD AUTO: 1 % (ref 0–6)
ERYTHROCYTE [DISTWIDTH] IN BLOOD BY AUTOMATED COUNT: 13 % (ref 11.6–15.1)
EST. AVERAGE GLUCOSE BLD GHB EST-MCNC: 117 MG/DL
HBA1C MFR BLD: 5.7 % (ref 4.2–6.3)
HCT VFR BLD AUTO: 37 % (ref 34.8–46.1)
HGB BLD-MCNC: 12.4 G/DL (ref 11.5–15.4)
LYMPHOCYTES # BLD AUTO: 1.97 THOUSANDS/ΜL (ref 0.6–4.47)
LYMPHOCYTES NFR BLD AUTO: 34 % (ref 14–44)
MCH RBC QN AUTO: 29.2 PG (ref 26.8–34.3)
MCHC RBC AUTO-ENTMCNC: 33.5 G/DL (ref 31.4–37.4)
MCV RBC AUTO: 87 FL (ref 82–98)
MONOCYTES # BLD AUTO: 0.37 THOUSAND/ΜL (ref 0.17–1.22)
MONOCYTES NFR BLD AUTO: 6 % (ref 4–12)
NEUTROPHILS # BLD AUTO: 3.28 THOUSANDS/ΜL (ref 1.85–7.62)
NEUTS SEG NFR BLD AUTO: 58 % (ref 43–75)
NRBC BLD AUTO-RTO: 0 /100 WBCS
PLATELET # BLD AUTO: 281 THOUSANDS/UL (ref 149–390)
PMV BLD AUTO: 10 FL (ref 8.9–12.7)
RBC # BLD AUTO: 4.24 MILLION/UL (ref 3.81–5.12)
WBC # BLD AUTO: 5.74 THOUSAND/UL (ref 4.31–10.16)

## 2018-02-21 PROCEDURE — 86140 C-REACTIVE PROTEIN: CPT | Performed by: PHYSICIAN ASSISTANT

## 2018-02-21 PROCEDURE — 83036 HEMOGLOBIN GLYCOSYLATED A1C: CPT | Performed by: PHYSICIAN ASSISTANT

## 2018-02-21 PROCEDURE — 36415 COLL VENOUS BLD VENIPUNCTURE: CPT | Performed by: PHYSICIAN ASSISTANT

## 2018-02-21 PROCEDURE — 86430 RHEUMATOID FACTOR TEST QUAL: CPT | Performed by: PHYSICIAN ASSISTANT

## 2018-02-21 PROCEDURE — 85025 COMPLETE CBC W/AUTO DIFF WBC: CPT | Performed by: PHYSICIAN ASSISTANT

## 2018-02-21 PROCEDURE — 86038 ANTINUCLEAR ANTIBODIES: CPT | Performed by: PHYSICIAN ASSISTANT

## 2018-02-22 LAB — RHEUMATOID FACT SER QL LA: NEGATIVE

## 2018-02-23 LAB — RYE IGE QN: NEGATIVE

## 2018-06-23 DIAGNOSIS — F32.4 MAJOR DEPRESSIVE DISORDER WITH SINGLE EPISODE, IN PARTIAL REMISSION (HCC): Primary | ICD-10-CM

## 2018-06-25 RX ORDER — ESCITALOPRAM OXALATE 10 MG/1
TABLET ORAL
Qty: 30 TABLET | Refills: 5 | Status: SHIPPED | OUTPATIENT
Start: 2018-06-25 | End: 2018-12-18 | Stop reason: SDUPTHER

## 2018-09-01 ENCOUNTER — APPOINTMENT (OUTPATIENT)
Dept: LAB | Facility: HOSPITAL | Age: 38
End: 2018-09-01

## 2018-09-01 ENCOUNTER — TRANSCRIBE ORDERS (OUTPATIENT)
Dept: ADMINISTRATIVE | Facility: HOSPITAL | Age: 38
End: 2018-09-01

## 2018-09-01 DIAGNOSIS — Z00.8 HEALTH EXAMINATION IN POPULATION SURVEYS: Primary | ICD-10-CM

## 2018-09-01 DIAGNOSIS — Z00.8 HEALTH EXAMINATION IN POPULATION SURVEYS: ICD-10-CM

## 2018-09-01 LAB
CHOLEST SERPL-MCNC: 183 MG/DL (ref 50–200)
EST. AVERAGE GLUCOSE BLD GHB EST-MCNC: 114 MG/DL
HBA1C MFR BLD: 5.6 % (ref 4.2–6.3)
HDLC SERPL-MCNC: 41 MG/DL (ref 40–60)
LDLC SERPL CALC-MCNC: 81 MG/DL (ref 0–100)
NONHDLC SERPL-MCNC: 142 MG/DL
TRIGL SERPL-MCNC: 306 MG/DL

## 2018-09-01 PROCEDURE — 83036 HEMOGLOBIN GLYCOSYLATED A1C: CPT

## 2018-09-01 PROCEDURE — 80061 LIPID PANEL: CPT

## 2018-09-01 PROCEDURE — 36415 COLL VENOUS BLD VENIPUNCTURE: CPT

## 2018-10-11 ENCOUNTER — OFFICE VISIT (OUTPATIENT)
Dept: FAMILY MEDICINE CLINIC | Facility: CLINIC | Age: 38
End: 2018-10-11
Payer: COMMERCIAL

## 2018-10-11 ENCOUNTER — HOSPITAL ENCOUNTER (OUTPATIENT)
Dept: RADIOLOGY | Facility: HOSPITAL | Age: 38
Discharge: HOME | End: 2018-10-11
Payer: COMMERCIAL

## 2018-10-11 ENCOUNTER — APPOINTMENT (OUTPATIENT)
Dept: LAB | Facility: HOSPITAL | Age: 38
End: 2018-10-11
Payer: COMMERCIAL

## 2018-10-11 VITALS
RESPIRATION RATE: 16 BRPM | OXYGEN SATURATION: 97 % | WEIGHT: 192.6 LBS | DIASTOLIC BLOOD PRESSURE: 80 MMHG | HEART RATE: 125 BPM | HEIGHT: 64 IN | SYSTOLIC BLOOD PRESSURE: 104 MMHG | TEMPERATURE: 102.5 F | BODY MASS INDEX: 32.88 KG/M2

## 2018-10-11 DIAGNOSIS — J06.9 UPPER RESPIRATORY TRACT INFECTION, UNSPECIFIED TYPE: ICD-10-CM

## 2018-10-11 DIAGNOSIS — J06.9 UPPER RESPIRATORY TRACT INFECTION, UNSPECIFIED TYPE: Primary | ICD-10-CM

## 2018-10-11 LAB
AMORPH URATE CRY URNS QL MICRO: ABNORMAL /HPF
BACTERIA UR QL AUTO: ABNORMAL /HPF
BASOPHILS # BLD AUTO: 0.03 THOUSANDS/ΜL (ref 0–0.1)
BASOPHILS NFR BLD AUTO: 1 % (ref 0–1)
BILIRUB UR QL STRIP: NEGATIVE
CLARITY UR: CLEAR
COLOR UR: YELLOW
EOSINOPHIL # BLD AUTO: 0.01 THOUSAND/ΜL (ref 0–0.61)
EOSINOPHIL NFR BLD AUTO: 0 % (ref 0–6)
ERYTHROCYTE [DISTWIDTH] IN BLOOD BY AUTOMATED COUNT: 12.2 % (ref 11.6–15.1)
GLUCOSE UR STRIP-MCNC: NEGATIVE MG/DL
HCT VFR BLD AUTO: 37.2 % (ref 34.8–46.1)
HGB BLD-MCNC: 12.7 G/DL (ref 11.5–15.4)
HGB UR QL STRIP.AUTO: ABNORMAL
IMM GRANULOCYTES # BLD AUTO: 0.01 THOUSAND/UL (ref 0–0.2)
IMM GRANULOCYTES NFR BLD AUTO: 0 % (ref 0–2)
KETONES UR STRIP-MCNC: ABNORMAL MG/DL
LEUKOCYTE ESTERASE UR QL STRIP: NEGATIVE
LYMPHOCYTES # BLD AUTO: 0.88 THOUSANDS/ΜL (ref 0.6–4.47)
LYMPHOCYTES NFR BLD AUTO: 20 % (ref 14–44)
MCH RBC QN AUTO: 30.1 PG (ref 26.8–34.3)
MCHC RBC AUTO-ENTMCNC: 34.1 G/DL (ref 31.4–37.4)
MCV RBC AUTO: 88 FL (ref 82–98)
MONOCYTES # BLD AUTO: 0.3 THOUSAND/ΜL (ref 0.17–1.22)
MONOCYTES NFR BLD AUTO: 7 % (ref 4–12)
NEUTROPHILS # BLD AUTO: 3.19 THOUSANDS/ΜL (ref 1.85–7.62)
NEUTS SEG NFR BLD AUTO: 72 % (ref 43–75)
NITRITE UR QL STRIP: NEGATIVE
NON-SQ EPI CELLS URNS QL MICRO: ABNORMAL /HPF
NRBC BLD AUTO-RTO: 0 /100 WBCS
PH UR STRIP.AUTO: 6 [PH] (ref 4.5–8)
PLATELET # BLD AUTO: 184 THOUSANDS/UL (ref 149–390)
PMV BLD AUTO: 10.4 FL (ref 8.9–12.7)
PROT UR STRIP-MCNC: NEGATIVE MG/DL
RBC # BLD AUTO: 4.22 MILLION/UL (ref 3.81–5.12)
RBC #/AREA URNS AUTO: ABNORMAL /HPF
SP GR UR STRIP.AUTO: 1.02 (ref 1–1.03)
UROBILINOGEN UR QL STRIP.AUTO: 0.2 E.U./DL
WBC # BLD AUTO: 4.42 THOUSAND/UL (ref 4.31–10.16)
WBC #/AREA URNS AUTO: ABNORMAL /HPF

## 2018-10-11 PROCEDURE — 71046 X-RAY EXAM CHEST 2 VIEWS: CPT

## 2018-10-11 PROCEDURE — 85025 COMPLETE CBC W/AUTO DIFF WBC: CPT

## 2018-10-11 PROCEDURE — 36415 COLL VENOUS BLD VENIPUNCTURE: CPT

## 2018-10-11 PROCEDURE — 81001 URINALYSIS AUTO W/SCOPE: CPT

## 2018-10-11 PROCEDURE — 99214 OFFICE O/P EST MOD 30 MIN: CPT | Performed by: FAMILY MEDICINE

## 2018-10-11 RX ORDER — AZITHROMYCIN 250 MG/1
TABLET, FILM COATED ORAL
Qty: 6 TABLET | Refills: 0 | Status: SHIPPED | OUTPATIENT
Start: 2018-10-11 | End: 2018-10-15

## 2018-10-11 NOTE — PROGRESS NOTES
Assessment/Plan:         Diagnoses and all orders for this visit:    Upper respiratory tract infection, unspecified type  -     XR chest pa & lateral; Future  -     CBC and differential; Future  -     Urinalysis with reflex to microscopic; Future  -     azithromycin (ZITHROMAX) 250 mg tablet; Take 2 tablets today then 1 tablet daily x 4 days       Discussed plan care, will obtain CBC urinalysis  And chest x-ray, due to the length of time increasing cough, stressed the importance of hydration rest Tylenol Motrin for fever aches and pains, any changes increases advise call office or go to er      Subjective:      Patient ID: Roc Valdez is a 41 y o  female  Sick   Initially started with a cough , thought to be bronchitis , last week  Then progressed to the fevers , which started 2-3 days ago ,   Son also has been sent home from school with similar complaints   Son went to doc this morning with      Head congestion , deep coughs make me cough , nefg hx of asthma , neg pneumonia,   Neg Rash , lesion , but bites ,   Neg urinary problems   Negative nausea and negative vomiting tolerating fluids, appetite okay  meds at home has been taking tylenol     Non smoker             The following portions of the patient's history were reviewed and updated as appropriate:   She has a past medical history of Cervical dysplasia; Female infertility; GDM (gestational diabetes mellitus), class A1; Gestational diabetes mellitus in pregnancy; HPV in female; Hypoplasia; Normal delivery; Varicella; and Visual impairment  ,   does not have any pertinent problems on file  ,   has a past surgical history that includes Colposcopy; [place] tooth extraction; and Hysteroscopy (Bilateral, 10/2013)  ,  family history includes Alcohol abuse in her father and paternal aunt; Breast cancer in her paternal grandmother; Diabetes type II in her maternal uncle;  Heart disease in her family and maternal grandfather; Liver cancer in her paternal grandfather; Uterine cancer in her maternal grandmother  ,   reports that she has never smoked  She has never used smokeless tobacco  She reports that she does not drink alcohol or use drugs  ,  has No Known Allergies         Review of Systems   Constitutional: Positive for fatigue and fever  HENT: Positive for congestion, postnasal drip and sinus pressure  Negative for rhinorrhea, sinus pain and sore throat  Eyes: Negative  Respiratory: Positive for cough  Cardiovascular: Negative  Gastrointestinal: Negative  Endocrine: Negative  Genitourinary: Negative  Musculoskeletal: Positive for myalgias  Negative for joint swelling and neck pain  Allergic/Immunologic: Negative  Neurological: Negative  Hematological: Negative  Psychiatric/Behavioral: Negative            Objective:  Vitals:    10/11/18 0939   BP: 104/80   Pulse: (!) 125   Resp: 16   Temp: (!) 102 5 °F (39 2 °C)   SpO2: 97%      Physical Exam

## 2018-10-18 ENCOUNTER — OFFICE VISIT (OUTPATIENT)
Dept: FAMILY MEDICINE CLINIC | Facility: CLINIC | Age: 38
End: 2018-10-18
Payer: COMMERCIAL

## 2018-10-18 VITALS
HEIGHT: 64 IN | DIASTOLIC BLOOD PRESSURE: 82 MMHG | SYSTOLIC BLOOD PRESSURE: 118 MMHG | TEMPERATURE: 98.7 F | HEART RATE: 80 BPM | BODY MASS INDEX: 32.61 KG/M2 | RESPIRATION RATE: 12 BRPM | OXYGEN SATURATION: 97 % | WEIGHT: 191 LBS

## 2018-10-18 DIAGNOSIS — J18.9 PNEUMONIA OF RIGHT LUNG DUE TO INFECTIOUS ORGANISM, UNSPECIFIED PART OF LUNG: Primary | ICD-10-CM

## 2018-10-18 PROCEDURE — 99213 OFFICE O/P EST LOW 20 MIN: CPT | Performed by: FAMILY MEDICINE

## 2018-10-18 PROCEDURE — 3008F BODY MASS INDEX DOCD: CPT | Performed by: FAMILY MEDICINE

## 2018-10-18 PROCEDURE — 1036F TOBACCO NON-USER: CPT | Performed by: FAMILY MEDICINE

## 2018-10-18 RX ORDER — ALBUTEROL SULFATE 90 UG/1
2 AEROSOL, METERED RESPIRATORY (INHALATION) EVERY 6 HOURS PRN
Qty: 18 G | Refills: 0 | Status: SHIPPED | OUTPATIENT
Start: 2018-10-18 | End: 2019-08-06

## 2018-10-18 NOTE — PROGRESS NOTES
Assessment/Plan:         Diagnoses and all orders for this visit:    Pneumonia of right lung due to infectious organism, unspecified part of lung  -     XR chest pa & lateral; Future  -     albuterol (VENTOLIN HFA) 90 mcg/act inhaler; Inhale 2 puffs every 6 (six) hours as needed for wheezing         discussed plan of care at length stressed the importance of rest, completed antibiotic, to continue Mucinex increase fluids instructed on use of inhaler for wheezing as needed will repeat chest x-ray 1 week advise call office for any changes worsening or return  of fever      Subjective:      Patient ID: Sameera Frazier is a 41 y o  female  here to f/u  X-ray, pneumonia  Has been feeling a bit better , no longer with fevers, approx a day after start of abx ,   Nasal congestion  The other day slight sinus   negative sore throat negative earache  Cough more so when entering the cold air    energy level is improving although has not been taking any easy return to work  levels  still down working on the last night's appetite fair tolerating fluids has increased intake  Neg smokeer , secondary  from  Family, negative history of   negative shortness of breath difficulty in breathing chest pain palpitations negative        The following portions of the patient's history were reviewed and updated as appropriate:   She has a past medical history of Cervical dysplasia; Female infertility; GDM (gestational diabetes mellitus), class A1; Gestational diabetes mellitus in pregnancy; HPV in female; Hypoplasia; Normal delivery; Varicella; and Visual impairment  ,   does not have any pertinent problems on file  ,   has a past surgical history that includes Colposcopy; [place] tooth extraction; and Hysteroscopy (Bilateral, 10/2013)  ,  family history includes Alcohol abuse in her father and paternal aunt; Breast cancer in her paternal grandmother; Diabetes type II in her maternal uncle;  Heart disease in her family and maternal grandfather; Liver cancer in her paternal grandfather; Uterine cancer in her maternal grandmother  ,   reports that she has never smoked  She has never used smokeless tobacco  She reports that she does not drink alcohol or use drugs  ,  has No Known Allergies         Review of Systems   Constitutional: Negative for appetite change, chills, fever and unexpected weight change  HENT: Negative for congestion, dental problem, ear pain, hearing loss, postnasal drip, rhinorrhea, sinus pain, sinus pressure, sneezing, sore throat, tinnitus and voice change  Eyes: Negative for visual disturbance  Respiratory: Positive for cough  Negative for apnea, chest tightness and shortness of breath  Cardiovascular: Negative for chest pain, palpitations and leg swelling  Gastrointestinal: Negative for abdominal pain, blood in stool, constipation, diarrhea, nausea and vomiting  Endocrine: Negative for cold intolerance, heat intolerance, polydipsia, polyphagia and polyuria  Genitourinary: Negative for decreased urine volume, difficulty urinating, dysuria, frequency and hematuria  Musculoskeletal: Negative for arthralgias, back pain, gait problem, joint swelling and myalgias  Skin: Negative for color change, rash and wound  Allergic/Immunologic: Negative for environmental allergies and food allergies  Neurological: Negative for dizziness, syncope, weakness, light-headedness, numbness and headaches  Hematological: Negative for adenopathy  Does not bruise/bleed easily  Psychiatric/Behavioral: Negative for sleep disturbance and suicidal ideas  The patient is not nervous/anxious  Objective:  Vitals:    10/18/18 1300   BP:    Pulse: 80   Resp: 12   Temp:    SpO2:       Physical Exam   Constitutional: She is oriented to person, place, and time  She appears well-developed and well-nourished  No distress  HENT:   Head: Normocephalic and atraumatic     Right Ear: External ear normal    Left Ear: External ear normal  Mouth/Throat: No oropharyngeal exudate  Eyes: Conjunctivae are normal  No scleral icterus  Neck: Normal range of motion  Neck supple  No thyromegaly present  Cardiovascular: Normal rate, regular rhythm and normal heart sounds  Pulmonary/Chest: Effort normal and breath sounds normal  No respiratory distress  She has no wheezes  Negative forced expiratory wheeze   Musculoskeletal: Normal range of motion  Lymphadenopathy:     She has no cervical adenopathy  Neurological: She is alert and oriented to person, place, and time  Skin: Skin is warm and dry  Psychiatric: She has a normal mood and affect   Her behavior is normal  Judgment and thought content normal

## 2018-10-23 ENCOUNTER — TELEPHONE (OUTPATIENT)
Dept: FAMILY MEDICINE CLINIC | Facility: CLINIC | Age: 38
End: 2018-10-23

## 2018-10-23 ENCOUNTER — APPOINTMENT (OUTPATIENT)
Dept: RADIOLOGY | Facility: CLINIC | Age: 38
End: 2018-10-23
Payer: COMMERCIAL

## 2018-10-23 DIAGNOSIS — J18.9 PNEUMONIA OF RIGHT LUNG DUE TO INFECTIOUS ORGANISM, UNSPECIFIED PART OF LUNG: ICD-10-CM

## 2018-10-23 DIAGNOSIS — J32.9 SINUSITIS, UNSPECIFIED CHRONICITY, UNSPECIFIED LOCATION: Primary | ICD-10-CM

## 2018-10-23 PROCEDURE — 71046 X-RAY EXAM CHEST 2 VIEWS: CPT

## 2018-10-23 RX ORDER — DOXYCYCLINE 100 MG/1
100 TABLET ORAL 2 TIMES DAILY
Qty: 20 TABLET | Refills: 0 | Status: SHIPPED | OUTPATIENT
Start: 2018-10-23 | End: 2018-11-02

## 2018-10-23 NOTE — TELEPHONE ENCOUNTER
She had her chest xray today  She has nasal congestion and green mucous    Can you call something in?

## 2018-12-18 DIAGNOSIS — F32.4 MAJOR DEPRESSIVE DISORDER WITH SINGLE EPISODE, IN PARTIAL REMISSION (HCC): ICD-10-CM

## 2018-12-18 RX ORDER — ESCITALOPRAM OXALATE 10 MG/1
10 TABLET ORAL DAILY
Qty: 30 TABLET | Refills: 5 | Status: SHIPPED | OUTPATIENT
Start: 2018-12-18 | End: 2019-04-05 | Stop reason: SDUPTHER

## 2019-01-13 ENCOUNTER — APPOINTMENT (EMERGENCY)
Dept: RADIOLOGY | Facility: HOSPITAL | Age: 39
End: 2019-01-13
Payer: COMMERCIAL

## 2019-01-13 ENCOUNTER — HOSPITAL ENCOUNTER (EMERGENCY)
Facility: HOSPITAL | Age: 39
Discharge: HOME | End: 2019-01-13
Attending: EMERGENCY MEDICINE
Payer: COMMERCIAL

## 2019-01-13 VITALS
OXYGEN SATURATION: 98 % | SYSTOLIC BLOOD PRESSURE: 122 MMHG | RESPIRATION RATE: 18 BRPM | HEIGHT: 64 IN | WEIGHT: 190 LBS | DIASTOLIC BLOOD PRESSURE: 65 MMHG | TEMPERATURE: 99.3 F | HEART RATE: 99 BPM | BODY MASS INDEX: 32.44 KG/M2

## 2019-01-13 DIAGNOSIS — M25.512 LEFT SHOULDER PAIN: Primary | ICD-10-CM

## 2019-01-13 DIAGNOSIS — R07.9 CHEST PAIN: ICD-10-CM

## 2019-01-13 LAB
ALBUMIN SERPL BCP-MCNC: 3.5 G/DL (ref 3.5–5)
ALP SERPL-CCNC: 77 U/L (ref 46–116)
ALT SERPL W P-5'-P-CCNC: 35 U/L (ref 12–78)
ANION GAP SERPL CALCULATED.3IONS-SCNC: 13 MMOL/L (ref 4–13)
AST SERPL W P-5'-P-CCNC: 27 U/L (ref 5–45)
ATRIAL RATE: 88 BPM
BASOPHILS # BLD AUTO: 0.05 THOUSANDS/ΜL (ref 0–0.1)
BASOPHILS NFR BLD AUTO: 1 % (ref 0–1)
BILIRUB SERPL-MCNC: 0.5 MG/DL (ref 0.2–1)
BUN SERPL-MCNC: 12 MG/DL (ref 5–25)
CALCIUM SERPL-MCNC: 8.9 MG/DL (ref 8.3–10.1)
CHLORIDE SERPL-SCNC: 102 MMOL/L (ref 100–108)
CO2 SERPL-SCNC: 26 MMOL/L (ref 21–32)
CREAT SERPL-MCNC: 0.91 MG/DL (ref 0.6–1.3)
DEPRECATED D DIMER PPP: <270 NG/ML (FEU)
EOSINOPHIL # BLD AUTO: 0.01 THOUSAND/ΜL (ref 0–0.61)
EOSINOPHIL NFR BLD AUTO: 0 % (ref 0–6)
ERYTHROCYTE [DISTWIDTH] IN BLOOD BY AUTOMATED COUNT: 12.9 % (ref 11.6–15.1)
GFR SERPL CREATININE-BSD FRML MDRD: 81 ML/MIN/1.73SQ M
GLUCOSE SERPL-MCNC: 121 MG/DL (ref 65–140)
HCT VFR BLD AUTO: 41 % (ref 34.8–46.1)
HGB BLD-MCNC: 13.5 G/DL (ref 11.5–15.4)
IMM GRANULOCYTES # BLD AUTO: 0.03 THOUSAND/UL (ref 0–0.2)
IMM GRANULOCYTES NFR BLD AUTO: 0 % (ref 0–2)
INR PPP: 0.97 (ref 0.86–1.17)
LYMPHOCYTES # BLD AUTO: 1.24 THOUSANDS/ΜL (ref 0.6–4.47)
LYMPHOCYTES NFR BLD AUTO: 18 % (ref 14–44)
MCH RBC QN AUTO: 28.9 PG (ref 26.8–34.3)
MCHC RBC AUTO-ENTMCNC: 32.9 G/DL (ref 31.4–37.4)
MCV RBC AUTO: 88 FL (ref 82–98)
MONOCYTES # BLD AUTO: 0.78 THOUSAND/ΜL (ref 0.17–1.22)
MONOCYTES NFR BLD AUTO: 11 % (ref 4–12)
NEUTROPHILS # BLD AUTO: 4.84 THOUSANDS/ΜL (ref 1.85–7.62)
NEUTS SEG NFR BLD AUTO: 70 % (ref 43–75)
NRBC BLD AUTO-RTO: 0 /100 WBCS
P AXIS: 38 DEGREES
PLATELET # BLD AUTO: 264 THOUSANDS/UL (ref 149–390)
PMV BLD AUTO: 9.9 FL (ref 8.9–12.7)
POTASSIUM SERPL-SCNC: 3.9 MMOL/L (ref 3.5–5.3)
PR INTERVAL: 122 MS
PROT SERPL-MCNC: 7.8 G/DL (ref 6.4–8.2)
PROTHROMBIN TIME: 12.7 SECONDS (ref 11.8–14.2)
QRS AXIS: 46 DEGREES
QRSD INTERVAL: 78 MS
QT INTERVAL: 378 MS
QTC INTERVAL: 457 MS
RBC # BLD AUTO: 4.67 MILLION/UL (ref 3.81–5.12)
S PYO AG THROAT QL: NEGATIVE
SODIUM SERPL-SCNC: 141 MMOL/L (ref 136–145)
T WAVE AXIS: 37 DEGREES
TROPONIN I SERPL-MCNC: <0.02 NG/ML
TSH SERPL DL<=0.05 MIU/L-ACNC: 2.13 UIU/ML (ref 0.36–3.74)
VENTRICULAR RATE: 88 BPM
WBC # BLD AUTO: 6.95 THOUSAND/UL (ref 4.31–10.16)

## 2019-01-13 PROCEDURE — 73030 X-RAY EXAM OF SHOULDER: CPT

## 2019-01-13 PROCEDURE — 71046 X-RAY EXAM CHEST 2 VIEWS: CPT

## 2019-01-13 PROCEDURE — 84484 ASSAY OF TROPONIN QUANT: CPT | Performed by: EMERGENCY MEDICINE

## 2019-01-13 PROCEDURE — 87430 STREP A AG IA: CPT | Performed by: EMERGENCY MEDICINE

## 2019-01-13 PROCEDURE — 85610 PROTHROMBIN TIME: CPT | Performed by: EMERGENCY MEDICINE

## 2019-01-13 PROCEDURE — 85379 FIBRIN DEGRADATION QUANT: CPT | Performed by: EMERGENCY MEDICINE

## 2019-01-13 PROCEDURE — 93010 ELECTROCARDIOGRAM REPORT: CPT | Performed by: INTERNAL MEDICINE

## 2019-01-13 PROCEDURE — 85025 COMPLETE CBC W/AUTO DIFF WBC: CPT | Performed by: EMERGENCY MEDICINE

## 2019-01-13 PROCEDURE — 93005 ELECTROCARDIOGRAM TRACING: CPT

## 2019-01-13 PROCEDURE — 84443 ASSAY THYROID STIM HORMONE: CPT | Performed by: EMERGENCY MEDICINE

## 2019-01-13 PROCEDURE — 36415 COLL VENOUS BLD VENIPUNCTURE: CPT | Performed by: EMERGENCY MEDICINE

## 2019-01-13 PROCEDURE — 99285 EMERGENCY DEPT VISIT HI MDM: CPT

## 2019-01-13 PROCEDURE — 80053 COMPREHEN METABOLIC PANEL: CPT | Performed by: EMERGENCY MEDICINE

## 2019-01-13 PROCEDURE — 87070 CULTURE OTHR SPECIMN AEROBIC: CPT | Performed by: EMERGENCY MEDICINE

## 2019-01-13 RX ORDER — METHOCARBAMOL 500 MG/1
1000 TABLET, FILM COATED ORAL 2 TIMES DAILY
Qty: 30 TABLET | Refills: 0 | Status: SHIPPED | OUTPATIENT
Start: 2019-01-13 | End: 2019-08-06

## 2019-01-13 RX ORDER — IBUPROFEN 600 MG/1
600 TABLET ORAL EVERY 6 HOURS PRN
Qty: 30 TABLET | Refills: 0 | Status: SHIPPED | OUTPATIENT
Start: 2019-01-13 | End: 2022-06-08 | Stop reason: ALTCHOICE

## 2019-01-13 RX ORDER — IBUPROFEN 600 MG/1
600 TABLET ORAL ONCE
Status: COMPLETED | OUTPATIENT
Start: 2019-01-13 | End: 2019-01-13

## 2019-01-13 RX ORDER — METHOCARBAMOL 500 MG/1
1000 TABLET, FILM COATED ORAL ONCE
Status: COMPLETED | OUTPATIENT
Start: 2019-01-13 | End: 2019-01-13

## 2019-01-13 RX ADMIN — METHOCARBAMOL TABLETS 1000 MG: 500 TABLET, COATED ORAL at 10:43

## 2019-01-13 RX ADMIN — IBUPROFEN 600 MG: 600 TABLET ORAL at 10:43

## 2019-01-13 NOTE — DISCHARGE INSTRUCTIONS
Arm Pain   WHAT YOU NEED TO KNOW:   Your arm pain may be caused by a number of conditions  Examples include arthritis, nerve problems, or an awkward position while you sleep  X-rays did not show a broken bone in your arm or wrist  Arm pain may be a sign of a serious condition that needs immediate care, such as a heart attack  DISCHARGE INSTRUCTIONS:   Call 911 for any of the following: You have any of the following signs of a heart attack:   · Squeezing, pressure, or pain in your chest that lasts longer than 5 minutes or returns    · Discomfort or pain in your back, neck, jaw, stomach, or arm     · Trouble breathing or a fast, fluttery heartbeat    · Nausea or vomiting    · Lightheadedness or a sudden cold sweat, especially with chest pain or trouble breathing  Return to the emergency department if:   · You have severe pain, or pain that spreads from your arm to other areas  · You have swelling, tingling, or numbness in your hand or fingers, or the skin turns blue  · You cannot move your arm  Contact your healthcare provider if:   · You have questions or concerns about your condition or care  Medicines: You may need any of the following:  · Prescription pain medicine  may be given  Ask how to take this medicine safely  · NSAIDs , such as ibuprofen, help decrease swelling, pain, and fever  This medicine is available with or without a doctor's order  NSAIDs can cause stomach bleeding or kidney problems in certain people  If you take blood thinner medicine, always ask your healthcare provider if NSAIDs are safe for you  Always read the medicine label and follow directions  · Take your medicine as directed  Contact your healthcare provider if you think your medicine is not helping or if you have side effects  Tell him or her if you are allergic to any medicine  Keep a list of the medicines, vitamins, and herbs you take  Include the amounts, and when and why you take them   Bring the list or the pill bottles to follow-up visits  Carry your medicine list with you in case of an emergency  Self-care:   · Rest your arm as directed  A sling may be used to keep your arm from moving while it heals  · Apply ice as directed  Ice helps decrease pain and swelling  Ice may also help prevent tissue damage  Use an ice pack, or put crushed ice in a plastic bag  Cover it with a towel  Apply it to your arm for 20 minutes every few hours, or as directed  Ask how many times to apply ice each day, and for how many days  · Elevate your arm above the level of your heart as often as you can  This will help decrease swelling and pain  Prop your arm on pillows or blankets to keep the area elevated comfortably  · Adjust your position if you work in front of a computer  You may need arm or wrist supports or change the height of your chair  · Keep a pain record  Write down when your pain happens and how severe it is  Include any other symptoms you have with your pain  A record will help you keep track of pain cycles  Bring the record with you to your follow-up visits  It may also help your healthcare provider find out what is causing your pain  Follow up with your healthcare provider as directed: You may need physical therapy  You may need to see an orthopedic specialist  Write down your questions so you remember to ask them during your visits  © 2017 [employer] Information is for End User's use only and may not be sold, redistributed or otherwise used for commercial purposes  All illustrations and images included in CareNotes® are the copyrighted property of [employer]  or [employer]  The above information is an  only  It is not intended as medical advice for individual conditions or treatments  Talk to your doctor, nurse or pharmacist before following any medical regimen to see if it is safe and effective for you

## 2019-01-13 NOTE — ED PROVIDER NOTES
History  Chief Complaint   Patient presents with    Chest Pain     pt presents with left sided chest pain x 3 days, states has been sick since October with URI symptoms     72-year-old female patient presents to the emergency department for evaluation of left arm discomfort which has progressively worsened to the point where now it is in her neck and chest   The patient has no noted injury  The patient does have chest discomfort now but no shortness of breath, no nausea, no vomiting, no diarrhea, no fevers, no chills, does have a cough and was diagnosed recently with bronchitis  On physical exam the patient does have some pharyngeal erythema as well  The patient was due for an adenoidectomy but this was unable to be done due to her other illness  Currently the patient is sitting in bed, in mild distress from pain in her left arm, but did drive herself here to the hospital so cannot be given stronger medications for her discomfort  Plan will be for evaluation with a differential diagnosis to include but not be limited to acute coronary syndrome, musculoskeletal left shoulder pain, cervical radiculopathy  History provided by:  Patient   used: No    Chest Pain   Pain location:  L chest  Pain quality: aching    Pain radiates to:  L shoulder  Pain radiates to the back: yes    Pain severity:  Mild  Timing:  Constant  Progression:  Worsening  Relieved by:  Nothing  Worsened by:  Nothing tried  Ineffective treatments:  None tried  Associated symptoms: no abdominal pain, no anorexia, no back pain, no claudication, no cough, no diaphoresis, no dizziness, no dysphagia, no fever, no headache, no nausea and not vomiting        Prior to Admission Medications   Prescriptions Last Dose Informant Patient Reported? Taking?    albuterol (VENTOLIN HFA) 90 mcg/act inhaler   No No   Sig: Inhale 2 puffs every 6 (six) hours as needed for wheezing   escitalopram (LEXAPRO) 10 mg tablet   No No   Sig: Take 1 tablet (10 mg total) by mouth daily      Facility-Administered Medications: None       Past Medical History:   Diagnosis Date    Cervical dysplasia     Female infertility     Last Assessed: 12/23/13    GDM (gestational diabetes mellitus), class A1     Last Assessed: 4/15/16    Gestational diabetes mellitus in pregnancy     with all pregnancy  Resolved: 12/20/17    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 positive    Hypoplasia     breasts    Normal delivery     Varicella     had as child    Visual impairment     glasses       Past Surgical History:   Procedure Laterality Date    COLPOSCOPY      HYSTEROSCOPY Bilateral 10/2013    Fallopian tube cannulation    WISDOM TOOTH EXTRACTION         Family History   Problem Relation Age of Onset    Alcohol abuse Father     Diabetes type II Maternal Uncle         Uncomplicated, Controlled    Heart disease Maternal Grandfather     Breast cancer Paternal Grandmother     Liver cancer Paternal Grandfather     Uterine cancer Maternal Grandmother     Alcohol abuse Paternal Aunt     Heart disease Family      I have reviewed and agree with the history as documented  Social History   Substance Use Topics    Smoking status: Never Smoker    Smokeless tobacco: Never Used    Alcohol use No        Review of Systems   Constitutional: Negative for activity change, appetite change, chills, diaphoresis, fever and unexpected weight change  HENT: Negative for congestion, hearing loss, mouth sores, nosebleeds, sore throat, tinnitus, trouble swallowing and voice change  Eyes: Negative for photophobia, pain, discharge, redness and itching  Respiratory: Negative for apnea, cough, choking and chest tightness  Cardiovascular: Positive for chest pain  Negative for claudication and leg swelling  Gastrointestinal: Negative for abdominal distention, abdominal pain, anorexia, constipation, diarrhea, nausea and vomiting     Endocrine: Negative for cold intolerance, heat intolerance, polydipsia and polyphagia  Genitourinary: Negative for difficulty urinating, dyspareunia, dysuria and enuresis  Musculoskeletal: Negative for arthralgias, back pain, gait problem, neck pain and neck stiffness  Skin: Negative for color change, pallor and rash  Allergic/Immunologic: Negative for environmental allergies, food allergies and immunocompromised state  Neurological: Negative for dizziness, facial asymmetry and headaches  Hematological: Negative for adenopathy  Does not bruise/bleed easily  Psychiatric/Behavioral: Negative for agitation, behavioral problems, confusion and hallucinations  The patient is not hyperactive  All other systems reviewed and are negative        Physical Exam  Physical Exam    Vital Signs  ED Triage Vitals [01/13/19 0833]   Temperature Pulse Respirations Blood Pressure SpO2   99 3 °F (37 4 °C) (!) 120 18 119/79 98 %      Temp Source Heart Rate Source Patient Position - Orthostatic VS BP Location FiO2 (%)   Oral Monitor Sitting Right arm --      Pain Score       --           Vitals:    01/13/19 0833 01/13/19 1000   BP: 119/79 122/65   Pulse: (!) 120 99   Patient Position - Orthostatic VS: Sitting        Visual Acuity      ED Medications  Medications   methocarbamol (ROBAXIN) tablet 1,000 mg (1,000 mg Oral Given 1/13/19 1043)   ibuprofen (MOTRIN) tablet 600 mg (600 mg Oral Given 1/13/19 1043)       Diagnostic Studies  Results Reviewed     Procedure Component Value Units Date/Time    Throat culture [[id number]] Collected:  01/13/19 0930    Lab Status:  Final result Specimen:  Throat from Throat Updated:  01/15/19 0913     Throat Culture Negative for beta-hemolytic Streptococcus    D-dimer, quantitative [[id number]]  (Normal) Collected:  01/13/19 0846    Lab Status:  Final result Specimen:  Blood from Arm, Right Updated:  01/13/19 1029     D-Dimer, Quant <270 ng/ml (FEU)     Rapid Strep A Screen Throat with Reflex to Culture, Pediatrics and Compromised Adults [[id number]] (Normal) Collected:  01/13/19 0930    Lab Status:  Final result Specimen:  Throat from Throat Updated:  01/13/19 0946     Rapid Strep A Screen Negative    TSH, 3rd generation with Free T4 reflex [[id number]]  (Normal) Collected:  01/13/19 0846    Lab Status:  Final result Specimen:  Blood from Arm, Right Updated:  01/13/19 0918     TSH 3RD GENERATON 2 133 uIU/mL     Narrative:         Patients undergoing fluorescein dye angiography may retain small amounts of fluorescein in the body for 48-72 hours post procedure  Samples containing fluorescein can produce falsely depressed TSH values  If the patient had this procedure,a specimen should be resubmitted post fluorescein clearance  The recommended reference ranges for TSH during pregnancy are as follows:  First trimester 0 1 to 2 5 uIU/mL  Second trimester  0 2 to 3 0 uIU/mL  Third trimester 0 3 to 3 0 uIU/m      Troponin I [[id number]]  (Normal) Collected:  01/13/19 0846    Lab Status:  Final result Specimen:  Blood from Arm, Right Updated:  01/13/19 0912     Troponin I <0 02 ng/mL     Comprehensive metabolic panel [[id number]] Collected:  01/13/19 0846    Lab Status:  Final result Specimen:  Blood from Arm, Right Updated:  01/13/19 0910     Sodium 141 mmol/L      Potassium 3 9 mmol/L      Chloride 102 mmol/L      CO2 26 mmol/L      ANION GAP 13 mmol/L      BUN 12 mg/dL      Creatinine 0 91 mg/dL      Glucose 121 mg/dL      Calcium 8 9 mg/dL      AST 27 U/L      ALT 35 U/L      Alkaline Phosphatase 77 U/L      Total Protein 7 8 g/dL      Albumin 3 5 g/dL      Total Bilirubin 0 50 mg/dL      eGFR 81 ml/min/1 73sq m     Narrative:         National Kidney Disease Education Program recommendations are as follows:  GFR calculation is accurate only with a steady state creatinine  Chronic Kidney disease less than 60 ml/min/1 73 sq  meters  Kidney failure less than 15 ml/min/1 73 sq  meters      Manas Johnson [[id number]]  (Normal) Collected:  01/13/19 0846    Lab Status: Final result Specimen:  Blood from Arm, Right Updated:  01/13/19 0909     Protime 12 7 seconds      INR 0 97    CBC and differential [[id number]] Collected:  01/13/19 0846    Lab Status:  Final result Specimen:  Blood from Arm, Right Updated:  01/13/19 0855     WBC 6 95 Thousand/uL      RBC 4 67 Million/uL      Hemoglobin 13 5 g/dL      Hematocrit 41 0 %      MCV 88 fL      MCH 28 9 pg      MCHC 32 9 g/dL      RDW 12 9 %      MPV 9 9 fL      Platelets 909 Thousands/uL      nRBC 0 /100 WBCs      Neutrophils Relative 70 %      Immat GRANS % 0 %      Lymphocytes Relative 18 %      Monocytes Relative 11 %      Eosinophils Relative 0 %      Basophils Relative 1 %      Neutrophils Absolute 4 84 Thousands/µL      Immature Grans Absolute 0 03 Thousand/uL      Lymphocytes Absolute 1 24 Thousands/µL      Monocytes Absolute 0 78 Thousand/µL      Eosinophils Absolute 0 01 Thousand/µL      Basophils Absolute 0 05 Thousands/µL                  XR shoulder 2+ views LEFT   Final Result by Claudia Story MD (01/13 1417)      No acute osseous abnormality  Mild osteoarthritis acromioclavicular joint  Workstation performed: [id number]         XR chest 2 views   ED Interpretation by Pedro Ware DO (01/13 1017)   No obvious acute abnormality      Final Result by Claudia Story MD (01/13 1416)      No acute cardiopulmonary disease              Workstation performed: [id number]                    Procedures  Procedures       Phone Contacts  ED Phone Contact    ED Course         HEART Risk Score      Most Recent Value   History  0 Filed at: 01/13/2019 1011   ECG  0 Filed at: 01/13/2019 1011   Age  0 Filed at: 01/13/2019 1011   Risk Factors  0 Filed at: 01/13/2019 1011   Troponin  0 Filed at: 01/13/2019 1011   Heart Score Risk Calculator   History  0 Filed at: 01/13/2019 1011   ECG  0 Filed at: 01/13/2019 1011   Age  0 Filed at: 01/13/2019 1011   Risk Factors  0 Filed at: 01/13/2019 1011   Troponin  0 Filed at: 01/13/2019 1011   HEART Score  0 Filed at: 01/13/2019 1011   HEART Score  0 Filed at: 01/13/2019 1011                            [facility]  Number of Diagnoses or Management Options  Chest pain: new and requires workup  Left shoulder pain: new and requires workup     Amount and/or Complexity of Data Reviewed  Clinical lab tests: ordered and reviewed  Tests in the radiology section of CPT®: ordered and reviewed  Decide to obtain previous medical records or to obtain history from someone other than the patient: yes  Review and summarize past medical records: yes    Patient Progress  Patient progress: stable    CritCare Time    Disposition  Final diagnoses:   Left shoulder pain   Chest pain     Time reflects when diagnosis was documented in both MDM as applicable and the Disposition within this note     Time User Action Codes Description Comment    1/13/2019 10:30 AM Ahmad  Add [M25 512] Left shoulder pain     1/13/2019 10:30 AM Ahmad  Add [R07 9] Chest pain       ED Disposition     ED Disposition Condition Comment    Discharge  Tifton Creed discharge to home/self care      Condition at discharge: Good        Follow-up Information     Follow up With Specialties Details Why Contact Info    Rosetta Mann MD Family Medicine   [address] [place] [address]  [phone]            Discharge Medication List as of 1/13/2019 10:32 AM      START taking these medications    Details   ibuprofen (MOTRIN) 600 mg tablet Take 1 tablet (600 mg total) by mouth every 6 (six) hours as needed for mild pain for up to 3 days, Starting Sun 1/13/2019, Until Wed 1/16/2019, Normal      methocarbamol (ROBAXIN) 500 mg tablet Take 2 tablets (1,000 mg total) by mouth 2 (two) times a day, Starting Sun 1/13/2019, Normal         CONTINUE these medications which have NOT CHANGED    Details   albuterol (VENTOLIN HFA) 90 mcg/act inhaler Inhale 2 puffs every 6 (six) hours as needed for wheezing, Starting u 10/18/2018, Normal escitalopram (LEXAPRO) 10 mg tablet Take 1 tablet (10 mg total) by mouth daily, Starting Tue 12/18/2018, Normal           No discharge procedures on file      ED Provider  Electronically Signed by           Pedro Ware DO  01/16/19 7542

## 2019-01-15 LAB — BACTERIA THROAT CULT: NORMAL

## 2019-03-06 ENCOUNTER — TELEPHONE (OUTPATIENT)
Dept: BARIATRICS | Facility: CLINIC | Age: 39
End: 2019-03-06

## 2019-04-05 DIAGNOSIS — F32.4 MAJOR DEPRESSIVE DISORDER WITH SINGLE EPISODE, IN PARTIAL REMISSION (HCC): ICD-10-CM

## 2019-04-07 RX ORDER — ESCITALOPRAM OXALATE 10 MG/1
10 TABLET ORAL DAILY
Qty: 30 TABLET | Refills: 1 | Status: SHIPPED | OUTPATIENT
Start: 2019-04-07 | End: 2019-05-23 | Stop reason: SDUPTHER

## 2019-05-23 ENCOUNTER — OFFICE VISIT (OUTPATIENT)
Dept: FAMILY MEDICINE CLINIC | Facility: CLINIC | Age: 39
End: 2019-05-23
Payer: COMMERCIAL

## 2019-05-23 VITALS
DIASTOLIC BLOOD PRESSURE: 90 MMHG | HEIGHT: 64 IN | OXYGEN SATURATION: 98 % | HEART RATE: 77 BPM | WEIGHT: 197 LBS | SYSTOLIC BLOOD PRESSURE: 124 MMHG | BODY MASS INDEX: 33.63 KG/M2

## 2019-05-23 DIAGNOSIS — M72.2 PLANTAR FASCIITIS: Primary | ICD-10-CM

## 2019-05-23 DIAGNOSIS — F32.4 MAJOR DEPRESSIVE DISORDER WITH SINGLE EPISODE, IN PARTIAL REMISSION (HCC): ICD-10-CM

## 2019-05-23 PROCEDURE — 3008F BODY MASS INDEX DOCD: CPT | Performed by: FAMILY MEDICINE

## 2019-05-23 PROCEDURE — 99214 OFFICE O/P EST MOD 30 MIN: CPT | Performed by: FAMILY MEDICINE

## 2019-05-23 PROCEDURE — 1036F TOBACCO NON-USER: CPT | Performed by: FAMILY MEDICINE

## 2019-05-23 RX ORDER — LORATADINE 10 MG/1
TABLET ORAL
COMMUNITY
Start: 2018-12-06 | End: 2019-08-06

## 2019-05-23 RX ORDER — ESCITALOPRAM OXALATE 10 MG/1
10 TABLET ORAL DAILY
Qty: 30 TABLET | Refills: 3 | Status: SHIPPED | OUTPATIENT
Start: 2019-05-23 | End: 2020-03-25 | Stop reason: SDUPTHER

## 2019-08-06 ENCOUNTER — ANNUAL EXAM (OUTPATIENT)
Dept: OBGYN CLINIC | Age: 39
End: 2019-08-06
Payer: COMMERCIAL

## 2019-08-06 VITALS
WEIGHT: 192 LBS | SYSTOLIC BLOOD PRESSURE: 132 MMHG | HEIGHT: 64 IN | DIASTOLIC BLOOD PRESSURE: 80 MMHG | BODY MASS INDEX: 32.78 KG/M2

## 2019-08-06 DIAGNOSIS — Z01.411 ENCOUNTER FOR GYNECOLOGICAL EXAMINATION WITH ABNORMAL FINDING: Primary | ICD-10-CM

## 2019-08-06 DIAGNOSIS — N92.0 MENORRHAGIA WITH REGULAR CYCLE: ICD-10-CM

## 2019-08-06 PROBLEM — Z01.419 ENCOUNTER FOR GYNECOLOGICAL EXAMINATION WITHOUT ABNORMAL FINDING: Status: ACTIVE | Noted: 2019-08-06

## 2019-08-06 PROCEDURE — S0612 ANNUAL GYNECOLOGICAL EXAMINA: HCPCS | Performed by: NURSE PRACTITIONER

## 2019-08-06 PROCEDURE — 87624 HPV HI-RISK TYP POOLED RSLT: CPT | Performed by: NURSE PRACTITIONER

## 2019-08-06 PROCEDURE — G0145 SCR C/V CYTO,THINLAYER,RESCR: HCPCS | Performed by: NURSE PRACTITIONER

## 2019-08-06 RX ORDER — TRANEXAMIC ACID 650 1/1
2 TABLET ORAL 3 TIMES DAILY
Qty: 30 TABLET | Refills: 1 | Status: SHIPPED | OUTPATIENT
Start: 2019-08-06 | End: 2019-08-11

## 2019-08-06 NOTE — PATIENT INSTRUCTIONS
Menorrhagia   AMBULATORY CARE:   Menorrhagia  is heavy menstrual bleeding for more than 7 days or severe menstrual bleeding for less than 7 days  Your menstrual bleeding and cramping are so heavy that you have trouble doing your usual daily activities  Your monthly period may also occur more often, and you may bleed between periods  Menorrhagia is common in adolescence and around menopause  Common symptoms include the following:   · Soaking a pad or tampon every 1 to 2 hours    · Using both a pad and a tampon    · Waking up at night to change your pad or tampon    · Blood clots with your bleeding for more than 1 day    · Abdominal pain or cramps  Call 911 for any of the following:   · You have chest pain and shortness of breath  · Your heart is fluttering or beating faster than usual for you  Seek care immediately if:   · You feel dizzy when you stand  · You feel confused  · You have severe abdominal pain, nausea, and vomiting  · Your skin or the whites of your eyes turn yellow  Contact your healthcare provider if:   · You need to change your pad or tampon more than 1 time per hour, for several hours in a row  · You feel more weak and tired than usual      · You have new coldness in your hands and feet  · You have questions or concerns about your condition or care  Medicines: You may need any of the following:  · Iron supplements  may be given if your blood iron level decreases because of heavy bleeding  · NSAIDs , such as ibuprofen, treat menstrual cramps  This medicine is available with or without a doctor's order  NSAIDs can cause stomach bleeding or kidney problems in certain people  If you take blood thinner medicine, always ask your healthcare provider if NSAIDs are safe for you  Always read the medicine label and follow directions  · Hormones  help slow or stop your bleeding and make your monthly periods more regular   This medicine may be given as birth control pills or an intrauterine device (IUD)  · Take your medicine as directed  Contact your healthcare provider if you think your medicine is not helping or if you have side effects  Tell him of her if you are allergic to any medicine  Keep a list of the medicines, vitamins, and herbs you take  Include the amounts, and when and why you take them  Bring the list or the pill bottles to follow-up visits  Carry your medicine list with you in case of an emergency  Manage your symptoms:   · Keep a supply of pads or tampons with you at all times  If possible, stay close to a bathroom  · Apply heat  on your abdomen for 20 to 30 minutes every 2 hours for as many days as directed  Heat helps decrease pain and cramps  Follow up with your healthcare provider as directed: You may need regular pelvic exams with Pap smears to monitor your condition  Write down your questions so you remember to ask them during your visits  © 2017 [employer] Information is for End User's use only and may not be sold, redistributed or otherwise used for commercial purposes  All illustrations and images included in CareNotes® are the copyrighted property of [employer]  or [employer]  The above information is an  only  It is not intended as medical advice for individual conditions or treatments  Talk to your doctor, nurse or pharmacist before following any medical regimen to see if it is safe and effective for you

## 2019-08-06 NOTE — PROGRESS NOTES
Diagnoses and all orders for this visit:    Encounter for gynecological examination with abnormal finding  -     Liquid-based pap, screening    Menorrhagia with regular cycle  -     US pelvis complete w transvaginal; Future  -     Tranexamic Acid 650 MG TABS; Take 2 tablets by mouth 3 (three) times a day for 5 days Take only on the heavy menses days          Calcium/vit d inclusion in the diet discussed, call with any issues, SBE reinforced, all concerns addressed  Pleasant 41 y o  premenopausal female here for annual exam  She denies any issues with bleeding or her menses  Reports regular cycles, seem to be q 6 weeks lately  +history of abnormal pap smears  Last Pap 2015, pap today  Denies vaginal issues  Denies pelvic pain  Vasectomy for BCM  Sexually active without any concerns  Past Medical History:   Diagnosis Date    Cervical dysplasia     Female infertility     Last Assessed: 12/23/13    GDM (gestational diabetes mellitus), class A1     Last Assessed: 4/15/16    Gestational diabetes mellitus in pregnancy     with all pregnancy   Resolved: 12/20/17    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 positive    Hypoplasia     breasts    Normal delivery     Obesity     Varicella     had as child    Visual impairment     glasses     Past Surgical History:   Procedure Laterality Date    ADENOIDECTOMY      COLPOSCOPY      HYSTEROSCOPY Bilateral 10/2013    Fallopian tube cannulation    WISDOM TOOTH EXTRACTION       Family History   Problem Relation Age of Onset    Alcohol abuse Father     Diabetes type II Maternal Uncle         Uncomplicated, Controlled    Heart disease Maternal Grandfather     Liver cancer Paternal Grandfather     Uterine cancer Maternal Grandmother     Alcohol abuse Paternal Aunt     Heart disease Family     Breast cancer Neg Hx     Colon cancer Neg Hx     Ovarian cancer Neg Hx     Cervical cancer Neg Hx      Social History     Tobacco Use    Smoking status: Never Smoker    Smokeless tobacco: Never Used   Substance Use Topics    Alcohol use: No    Drug use: No       Current Outpatient Medications:     escitalopram (LEXAPRO) 10 mg tablet, Take 1 tablet (10 mg total) by mouth daily, Disp: 30 tablet, Rfl: 3    ibuprofen (MOTRIN) 600 mg tablet, Take 1 tablet (600 mg total) by mouth every 6 (six) hours as needed for mild pain for up to 3 days, Disp: 30 tablet, Rfl: 0    Tranexamic Acid 650 MG TABS, Take 2 tablets by mouth 3 (three) times a day for 5 days Take only on the heavy menses days, Disp: 30 tablet, Rfl: 1  Patient Active Problem List    Diagnosis Date Noted    Encounter for gynecological examination with abnormal finding 2019    [facility] with single [facility]    Plantar fasciitis 2019    Arthralgia 2017    Malaise and fatigue 2017    Anxiety 08/15/2017    Vaginal delivery 03/10/2016    Mild cervical dysplasia 2013       No Known Allergies    OB History    Para Term  AB Living   3 3 3     3   SAB TAB Ectopic Multiple Live Births         0 3      # Outcome Date GA Lbr [place]/2nd Weight Sex Delivery Anes PTL Lv   3 Term 03/10/16 38w4d 01:55 / 00:10 3300 g (7 lb 4 4 oz) M Vag-Spont None N ABDULLAHI      Complications: Gestational diabetes mellitus in childbirth, diet controlled   2 Term 14   2948 g (6 lb 8 oz) F Vag-Spont EPI N ABDULLAHI      Complications: Gestational diabetes   1 Term 11   2776 g (6 lb 1 9 oz) M Vag-Spont EPI N ABDULLAHI      Complications: PIH (pregnancy induced hypertension), Gestational diabetes      Obstetric Comments   Gestational diabetes diet controlled , increase bmi, mild cervical dysplasia      [employer] graveyard shift  2 sons 3 and 7  Daughter is 10 yo    Vitals:    19 0848   BP: 132/80   BP Location: Right arm   Patient Position: Sitting   Weight: 87 1 kg (192 lb)   Height: 5' 4" (1 626 m)     Body mass index is 32 96 kg/m²      Review of Systems Constitutional: Negative for chills, fatigue, fever and unexpected weight change  Respiratory: Negative for shortness of breath  Gastrointestinal: Negative for anal bleeding, blood in stool, constipation and diarrhea  Genitourinary: Negative for difficulty urinating, dysuria and hematuria  Physical Exam   Constitutional: She appears well-developed and well-nourished  No distress  HENT:   Head: Normocephalic  Neck: Normal range of motion  Neck supple  Pulmonary: Effort normal   Breasts: bilateral without masses, skin changes or nipple discharge  Bilaterally soft and warm to touch  No areas of erythema or pain  Abdominal: Soft  Pelvic exam was performed with patient supine  No labial fusion  There is no rash, tenderness, lesion or injury on the right labia  There is no rash, tenderness, lesion or injury on the left labia  Urethral meatus does not show any tenderness, inflammation or discharge  Palpation of midline bladder without pain or discomfort  Uterus is not deviated, not enlarged, not fixed and not tender  Cervix exhibits no motion tenderness, no discharge and no friability  Right adnexum displays no mass, no tenderness and no fullness  Left adnexum displays no mass, no tenderness and no fullness  No erythema or tenderness in the vagina  No foreign body in the vagina  No signs of injury around the vagina  No vaginal discharge found  No signs of injury around the vagina or anus  Perineum without lesions, signs of injury, erythema or swelling  Lymphadenopathy:        Right: No inguinal adenopathy present  Left: No inguinal adenopathy present

## 2019-08-07 LAB
HPV HR 12 DNA CVX QL NAA+PROBE: NEGATIVE
HPV16 DNA CVX QL NAA+PROBE: NEGATIVE
HPV18 DNA CVX QL NAA+PROBE: NEGATIVE

## 2019-08-09 LAB
LAB AP GYN PRIMARY INTERPRETATION: NORMAL
Lab: NORMAL

## 2020-03-12 DIAGNOSIS — F32.4 MAJOR DEPRESSIVE DISORDER WITH SINGLE EPISODE, IN PARTIAL REMISSION (HCC): ICD-10-CM

## 2020-03-12 RX ORDER — ESCITALOPRAM OXALATE 10 MG/1
20 TABLET ORAL DAILY
Qty: 30 TABLET | Refills: 3 | Status: CANCELLED | OUTPATIENT
Start: 2020-03-12

## 2020-03-25 DIAGNOSIS — F32.4 MAJOR DEPRESSIVE DISORDER WITH SINGLE EPISODE, IN PARTIAL REMISSION (HCC): ICD-10-CM

## 2020-03-25 RX ORDER — ESCITALOPRAM OXALATE 20 MG/1
20 TABLET ORAL DAILY
Qty: 30 TABLET | Refills: 2 | Status: SHIPPED | OUTPATIENT
Start: 2020-03-25 | End: 2020-07-01

## 2020-04-16 DIAGNOSIS — F32.4 MAJOR DEPRESSIVE DISORDER WITH SINGLE EPISODE, IN PARTIAL REMISSION (HCC): ICD-10-CM

## 2020-04-16 RX ORDER — ESCITALOPRAM OXALATE 10 MG/1
TABLET ORAL
Qty: 30 TABLET | Refills: 0 | Status: SHIPPED | OUTPATIENT
Start: 2020-04-16 | End: 2020-07-01

## 2020-05-18 DIAGNOSIS — F32.4 MAJOR DEPRESSIVE DISORDER WITH SINGLE EPISODE, IN PARTIAL REMISSION (HCC): ICD-10-CM

## 2020-05-27 RX ORDER — ESCITALOPRAM OXALATE 10 MG/1
TABLET ORAL
Qty: 30 TABLET | Refills: 0 | OUTPATIENT
Start: 2020-05-27

## 2020-07-01 ENCOUNTER — OFFICE VISIT (OUTPATIENT)
Dept: FAMILY MEDICINE CLINIC | Facility: CLINIC | Age: 40
End: 2020-07-01
Payer: COMMERCIAL

## 2020-07-01 VITALS
DIASTOLIC BLOOD PRESSURE: 80 MMHG | BODY MASS INDEX: 32.61 KG/M2 | OXYGEN SATURATION: 99 % | WEIGHT: 191 LBS | HEART RATE: 89 BPM | TEMPERATURE: 99.1 F | HEIGHT: 64 IN | RESPIRATION RATE: 12 BRPM | SYSTOLIC BLOOD PRESSURE: 115 MMHG

## 2020-07-01 DIAGNOSIS — F32.4 MAJOR DEPRESSIVE DISORDER WITH SINGLE EPISODE, IN PARTIAL REMISSION (HCC): ICD-10-CM

## 2020-07-01 DIAGNOSIS — F41.9 ANXIETY: Primary | ICD-10-CM

## 2020-07-01 DIAGNOSIS — Z13.220 ENCOUNTER FOR SCREENING FOR LIPID DISORDER: ICD-10-CM

## 2020-07-01 PROCEDURE — 3008F BODY MASS INDEX DOCD: CPT | Performed by: FAMILY MEDICINE

## 2020-07-01 PROCEDURE — 99214 OFFICE O/P EST MOD 30 MIN: CPT | Performed by: FAMILY MEDICINE

## 2020-07-01 PROCEDURE — 1036F TOBACCO NON-USER: CPT | Performed by: FAMILY MEDICINE

## 2020-07-01 RX ORDER — ESCITALOPRAM OXALATE 10 MG/1
10 TABLET ORAL DAILY
Qty: 30 TABLET | Refills: 5 | Status: SHIPPED | OUTPATIENT
Start: 2020-07-01 | End: 2021-06-01

## 2020-07-01 RX ORDER — ESCITALOPRAM OXALATE 10 MG/1
10 TABLET ORAL DAILY
Qty: 30 TABLET | Refills: 5 | Status: SHIPPED | OUTPATIENT
Start: 2020-07-01 | End: 2020-07-01 | Stop reason: SDUPTHER

## 2020-07-01 NOTE — PROGRESS NOTES
BMI Counseling: Body mass index is 32 79 kg/m²  The BMI is above normal  Nutrition recommendations include decreasing portion sizes, decreasing fast food intake, limiting drinks that contain sugar, moderation in carbohydrate intake and reducing intake of saturated and trans fat  Exercise recommendations include moderate physical activity 150 minutes/week and exercising 3-5 times per week  No pharmacotherapy was ordered  Assessment/Plan:     Chronic Problems:  No problem-specific Assessment & Plan notes found for this encounter  Visit Diagnosis:  Diagnoses and all orders for this visit:    Anxiety  -     Comprehensive metabolic panel; Future  -     Lipid panel; Future  -     Hemoglobin A1C; Future  -     TSH, 3rd generation; Future    Encounter for screening for lipid disorder  -     Comprehensive metabolic panel; Future  -     Lipid panel; Future  -     Hemoglobin A1C; Future  -     TSH, 3rd generation; Future    Major depressive disorder with single episode, in partial remission (HCC)  -     Discontinue: escitalopram (LEXAPRO) 10 mg tablet; Take 1 tablet (10 mg total) by mouth daily  -     escitalopram (LEXAPRO) 10 mg tablet; Take 1 tablet (10 mg total) by mouth daily    lanie   Stable , will continue lexapro 10mg , doing well , discussed issues ,   F/u   Hyperlipidemia   Discussed previous will obtain updated  Stressed modification in diet habit       Subjective:    Patient ID: Viviana De Leon is a 42 y o  female      Anxiety   Did try to up dose thinking anxiety needed improved control but after introspection , decided lexpro 10mg was just fine   Now recent labs   Snoring , in the future would like to discuss further   Negative chest pain palpitations shortness of breath difficulty breathing cough lesions rash  Works lab [employer]   Exercise   Diet           The following portions of the patient's history were reviewed and updated as appropriate: allergies, current medications, past family history, past medical history, past social history, past surgical history and problem list     Review of Systems   Constitutional: Negative for appetite change, chills, fever and unexpected weight change  HENT: Negative for congestion, dental problem, ear pain, hearing loss, postnasal drip, rhinorrhea, sinus pressure, sinus pain, sneezing, sore throat, tinnitus and voice change  Eyes: Negative for visual disturbance  Respiratory: Negative for apnea, cough, chest tightness and shortness of breath  Cardiovascular: Negative for chest pain, palpitations and leg swelling  Gastrointestinal: Negative for abdominal pain, blood in stool, constipation, diarrhea, nausea and vomiting  Endocrine: Negative for cold intolerance, heat intolerance, polydipsia, polyphagia and polyuria  Genitourinary: Negative for decreased urine volume, difficulty urinating, dysuria, frequency and hematuria  Musculoskeletal: Negative for arthralgias, back pain, gait problem, joint swelling and myalgias  Skin: Negative for color change, rash and wound  Allergic/Immunologic: Negative for environmental allergies and food allergies  Neurological: Negative for dizziness, syncope, weakness, light-headedness, numbness and headaches  Hematological: Negative for adenopathy  Does not bruise/bleed easily  Psychiatric/Behavioral: Negative for sleep disturbance and suicidal ideas  The patient is not nervous/anxious            /80 (BP Location: Left arm, Patient Position: Sitting, Cuff Size: Adult)   Pulse 89   Temp 99 1 °F (37 3 °C)   Resp 12   Ht 5' 4" (1 626 m)   Wt 86 6 kg (191 lb)   LMP 06/30/2020   SpO2 99%   BMI 32 79 kg/m²   Social History     Socioeconomic History    Marital status: /Civil Union     Spouse name: Not on file    Number of children: Not on file    Years of education: Not on file    Highest education level: Not on file   Occupational History    Not on file   Social Needs    Financial resource strain: Not on file    Food insecurity:     Worry: Not on file     Inability: Not on file    Transportation needs:     Medical: Not on file     Non-medical: Not on file   Tobacco Use    Smoking status: Never Smoker    Smokeless tobacco: Never Used   Substance and Sexual Activity    Alcohol use: No    Drug use: No    Sexual activity: Yes     Partners: Male     Birth control/protection: Male Sterilization   Lifestyle    Physical activity:     Days per week: Not on file     Minutes per session: Not on file    Stress: Not on file   Relationships    Social connections:     Talks on phone: Not on file     Gets together: Not on file     Attends Restorationist service: Not on file     Active member of club or organization: Not on file     Attends meetings of clubs or organizations: Not on file     Relationship status: Not on file    Intimate partner violence:     Fear of current or ex partner: Not on file     Emotionally abused: Not on file     Physically abused: Not on file     Forced sexual activity: Not on file   Other Topics Concern    Not on file   Social History Narrative    Per [employer]: Being a social drinker    Full-time employment    Uses safety equipment     Past Medical History:   Diagnosis Date    Cervical dysplasia     Female infertility     Last Assessed: 12/23/13    GDM (gestational diabetes mellitus), class A1     Last Assessed: 4/15/16    Gestational diabetes mellitus in pregnancy     with all pregnancy   Resolved: 12/20/17    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 positive    Hypoplasia     breasts    Normal delivery     Obesity     Varicella     had as child    Visual impairment     glasses     Family History   Problem Relation Age of Onset    Alcohol abuse Father     Diabetes type II Maternal Uncle         Uncomplicated, Controlled    Heart disease Maternal Grandfather     Liver cancer Paternal Grandfather     Uterine cancer Maternal Grandmother     Alcohol abuse Paternal Aunt     Heart disease Family  Breast cancer Neg Hx     Colon cancer Neg Hx     Ovarian cancer Neg Hx     Cervical cancer Neg Hx      Past Surgical History:   Procedure Laterality Date    ADENOIDECTOMY      COLPOSCOPY      HYSTEROSCOPY Bilateral 10/2013    Fallopian tube cannulation    WISDOM TOOTH EXTRACTION         Current Outpatient Medications:     escitalopram (LEXAPRO) 10 mg tablet, Take 1 tablet (10 mg total) by mouth daily, Disp: 30 tablet, Rfl: 5    ibuprofen (MOTRIN) 600 mg tablet, Take 1 tablet (600 mg total) by mouth every 6 (six) hours as needed for mild pain for up to 3 days, Disp: 30 tablet, Rfl: 0    No Known Allergies       Lab Review   No visits with results within 6 Month(s) from this visit  Latest known visit with results is:   Annual Exam on 08/06/2019   Component Date Value    Case Report 08/06/2019                      Value:Gynecologic Cytology Report                       Case: [id number]                                  Authorizing Provider:  RAHEEL Mann       Collected:           08/06/2019 0908              Ordering Location:     [facility]      Received:            08/06/2019 0908                                     [facility]                                                                  First Screen:          IFTIKHAR Salinas                                                    Specimen:    LIQUID-BASED PAP, SCREENING, Cervix                                                        Primary Interpretation 08/06/2019 Negative for intraepithelial lesion or malignancy     Specimen Adequacy 08/06/2019 Satisfactory for evaluation  Endocervical/transformation zone component present   Additional Information 08/06/2019                      Value: This result contains rich text formatting which cannot be displayed here      HPV Other HR 08/06/2019 Negative     HPV16 08/06/2019 Negative     HPV18 08/06/2019 Negative         Imaging: No results found  Objective:     Physical Exam   Constitutional: She is oriented to person, place, and time  She appears well-developed and well-nourished  No distress  HENT:   Head: Normocephalic and atraumatic  Right Ear: External ear normal    Left Ear: External ear normal    Eyes: Conjunctivae are normal    Neck: Normal range of motion  Neck supple  Cardiovascular: Normal rate, regular rhythm and normal heart sounds  Pulmonary/Chest: Effort normal and breath sounds normal    Musculoskeletal: Normal range of motion  Neurological: She is alert and oriented to person, place, and time  She has normal reflexes  Skin: Skin is warm and dry  Psychiatric: She has a normal mood and affect  Her behavior is normal  Judgment and thought content normal          There are no Patient Instructions on file for this visit  RAHEEL Dumont    Portions of the record may have been created with voice recognition software  Occasional wrong word or "sound a like" substitutions may have occurred due to the inherent limitations of voice recognition software  Read the chart carefully and recognize, using context, where substitutions have occurred

## 2020-08-05 ENCOUNTER — LAB REQUISITION (OUTPATIENT)
Dept: LAB | Facility: HOSPITAL | Age: 40
End: 2020-08-05
Payer: COMMERCIAL

## 2020-08-05 DIAGNOSIS — Z13.220 ENCOUNTER FOR SCREENING FOR LIPOID DISORDERS: ICD-10-CM

## 2020-08-05 LAB
ALBUMIN SERPL BCP-MCNC: 3.9 G/DL (ref 3.5–5)
ALP SERPL-CCNC: 55 U/L (ref 46–116)
ALT SERPL W P-5'-P-CCNC: 25 U/L (ref 12–78)
ANION GAP SERPL CALCULATED.3IONS-SCNC: 9 MMOL/L (ref 4–13)
AST SERPL W P-5'-P-CCNC: 14 U/L (ref 5–45)
BILIRUB SERPL-MCNC: 0.8 MG/DL (ref 0.2–1)
BUN SERPL-MCNC: 14 MG/DL (ref 5–25)
CALCIUM SERPL-MCNC: 9.2 MG/DL (ref 8.3–10.1)
CHLORIDE SERPL-SCNC: 104 MMOL/L (ref 100–108)
CHOLEST SERPL-MCNC: 196 MG/DL (ref 50–200)
CO2 SERPL-SCNC: 29 MMOL/L (ref 21–32)
CREAT SERPL-MCNC: 0.84 MG/DL (ref 0.6–1.3)
EST. AVERAGE GLUCOSE BLD GHB EST-MCNC: 114 MG/DL
GFR SERPL CREATININE-BSD FRML MDRD: 89 ML/MIN/1.73SQ M
GLUCOSE P FAST SERPL-MCNC: 106 MG/DL (ref 65–99)
HBA1C MFR BLD: 5.6 %
HDLC SERPL-MCNC: 51 MG/DL
LDLC SERPL CALC-MCNC: 122 MG/DL (ref 0–100)
NONHDLC SERPL-MCNC: 145 MG/DL
POTASSIUM SERPL-SCNC: 4.3 MMOL/L (ref 3.5–5.3)
PROT SERPL-MCNC: 7.5 G/DL (ref 6.4–8.2)
SODIUM SERPL-SCNC: 142 MMOL/L (ref 136–145)
TRIGL SERPL-MCNC: 113 MG/DL
TSH SERPL DL<=0.05 MIU/L-ACNC: 2.06 UIU/ML (ref 0.36–3.74)

## 2020-08-05 PROCEDURE — 80053 COMPREHEN METABOLIC PANEL: CPT | Performed by: FAMILY MEDICINE

## 2020-08-05 PROCEDURE — 83036 HEMOGLOBIN GLYCOSYLATED A1C: CPT | Performed by: FAMILY MEDICINE

## 2020-08-05 PROCEDURE — 80061 LIPID PANEL: CPT | Performed by: FAMILY MEDICINE

## 2020-08-05 PROCEDURE — 84443 ASSAY THYROID STIM HORMONE: CPT | Performed by: FAMILY MEDICINE

## 2020-08-18 ENCOUNTER — TELEPHONE (OUTPATIENT)
Dept: FAMILY MEDICINE CLINIC | Facility: CLINIC | Age: 40
End: 2020-08-18

## 2020-08-18 NOTE — TELEPHONE ENCOUNTER
Spoke with patient and informed the patient of lab results, which the patient understood and discuss weight loss and dietary changes

## 2020-08-18 NOTE — TELEPHONE ENCOUNTER
----- Message from Ange Cummings, [address] sent at 8/18/2020  2:58 PM EDT -----  Inform results  Hemoglobin A1c 5 6 within normal range  Cholesterol mild elevation LDL cholesterol, stressed diet increase fiber intake  Kidney function within normal limits as is liver function blood sugar slightly elevated 106 encourage weight loss

## 2020-12-27 ENCOUNTER — IMMUNIZATIONS (OUTPATIENT)
Dept: FAMILY MEDICINE CLINIC | Facility: HOSPITAL | Age: 40
End: 2020-12-27
Payer: COMMERCIAL

## 2020-12-27 DIAGNOSIS — Z23 ENCOUNTER FOR IMMUNIZATION: ICD-10-CM

## 2020-12-27 PROCEDURE — 91301 SARS-COV-2 / COVID-19 MRNA VACCINE (MODERNA) 100 MCG: CPT

## 2020-12-27 PROCEDURE — 0011A SARS-COV-2 / COVID-19 MRNA VACCINE (MODERNA) 100 MCG: CPT

## 2021-01-04 ENCOUNTER — OFFICE VISIT (OUTPATIENT)
Dept: FAMILY MEDICINE CLINIC | Facility: CLINIC | Age: 41
End: 2021-01-04

## 2021-01-04 VITALS
HEART RATE: 95 BPM | WEIGHT: 175.6 LBS | TEMPERATURE: 97.9 F | OXYGEN SATURATION: 98 % | HEIGHT: 64 IN | DIASTOLIC BLOOD PRESSURE: 77 MMHG | SYSTOLIC BLOOD PRESSURE: 137 MMHG | BODY MASS INDEX: 29.98 KG/M2 | RESPIRATION RATE: 19 BRPM

## 2021-01-04 DIAGNOSIS — E78.2 MIXED HYPERLIPIDEMIA: ICD-10-CM

## 2021-01-04 DIAGNOSIS — F41.9 ANXIETY: ICD-10-CM

## 2021-01-04 DIAGNOSIS — R73.01 IFG (IMPAIRED FASTING GLUCOSE): ICD-10-CM

## 2021-01-04 DIAGNOSIS — R06.83 SNORING: Primary | ICD-10-CM

## 2021-01-04 NOTE — PROGRESS NOTES
Assessment/Plan:     Chronic Problems:  No problem-specific Assessment & Plan notes found for this encounter  Visit Diagnosis:  Diagnoses and all orders for this visit:    Snoring  -     Ambulatory referral to Sleep Medicine; Future  -     Comprehensive metabolic panel; Future  -     CBC and differential; Future  -     Lipid panel; Future  -     TSH, 3rd generation; Future    Anxiety  -     Comprehensive metabolic panel; Future  -     CBC and differential; Future  -     Lipid panel; Future  -     TSH, 3rd generation; Future    IFG (impaired fasting glucose)  -     Comprehensive metabolic panel; Future  -     CBC and differential; Future  -     Lipid panel; Future  -     TSH, 3rd generation; Future    Mixed hyperlipidemia  -     Comprehensive metabolic panel; Future  -     CBC and differential; Future  -     Lipid panel; Future  -     TSH, 3rd generation; Future      [place]ell   Stable , discussed current care to continue   lexapro  Snoring   Discussed issue in relation to potential bruna , rec eval , ref place for sleep study  ifg   encouraged continued weight loss  hyperlipid  Will obtain updated labs , encourage continued diet modification weight loss program    Subjective:    Patient ID: Stu Sampson is a 47 y o  female  Here for on anxiety , doing well , meds help to some degree , takes the edge off  Neg missed dose , no known issu in regard to se   Sleep , heavy snoring ,  with cpap , thinks I need to be tested  Unsure about apnea as  sleep with cpap, and noise canc device due to my snoring?  daytime fatigue ?   Still at the lab , testing    chol / ifg weight is down , salads and walking , trying         The following portions of the patient's history were reviewed and updated as appropriate: allergies, current medications, past family history, past medical history, past social history, past surgical history and problem list     Review of Systems   Constitutional: Negative for appetite change, chills, fever and unexpected weight change  HENT: Negative for congestion, dental problem, ear pain, hearing loss, postnasal drip, rhinorrhea, sinus pressure, sinus pain, sneezing, sore throat, tinnitus and voice change  Eyes: Negative for visual disturbance  Respiratory: Negative for apnea, cough, chest tightness and shortness of breath  Cardiovascular: Negative for chest pain, palpitations and leg swelling  Gastrointestinal: Negative for abdominal pain, blood in stool, constipation, diarrhea, nausea and vomiting  Endocrine: Negative for cold intolerance, heat intolerance, polydipsia, polyphagia and polyuria  Genitourinary: Negative for decreased urine volume, difficulty urinating, dysuria, frequency and hematuria  Musculoskeletal: Negative for arthralgias, back pain, gait problem, joint swelling and myalgias  Skin: Negative for color change, rash and wound  Allergic/Immunologic: Negative for environmental allergies and food allergies  Neurological: Negative for dizziness, syncope, weakness, light-headedness, numbness and headaches  Hematological: Negative for adenopathy  Does not bruise/bleed easily  Psychiatric/Behavioral: Negative for sleep disturbance and suicidal ideas  The patient is not nervous/anxious            /77 (BP Location: Left arm, Patient Position: Sitting, Cuff Size: Adult)   Pulse 95   Temp 97 9 °F (36 6 °C)   Resp 19   Ht 5' 4" (1 626 m)   Wt 79 7 kg (175 lb 9 6 oz)   SpO2 98%   BMI 30 14 kg/m²   Social History     Socioeconomic History    Marital status: /Civil Union     Spouse name: Not on file    Number of children: Not on file    Years of education: Not on file    Highest education level: Not on file   Occupational History    Not on file   Social Needs    Financial resource strain: Not on file    Food insecurity     Worry: Not on file     Inability: Not on file    Transportation needs     Medical: Not on file     Non-medical: Not on file   Tobacco Use    Smoking status: Never Smoker    Smokeless tobacco: Never Used   Substance and Sexual Activity    Alcohol use: No    Drug use: No    Sexual activity: Yes     Partners: Male     Birth control/protection: Male Sterilization   Lifestyle    Physical activity     Days per week: Not on file     Minutes per session: Not on file    Stress: Not on file   Relationships    Social connections     Talks on phone: Not on file     Gets together: Not on file     Attends Tenriism service: Not on file     Active member of club or organization: Not on file     Attends meetings of clubs or organizations: Not on file     Relationship status: Not on file    Intimate partner violence     Fear of current or ex partner: Not on file     Emotionally abused: Not on file     Physically abused: Not on file     Forced sexual activity: Not on file   Other Topics Concern    Not on file   Social History Narrative    Per [employer]: Being a social drinker    Full-time employment    Uses safety equipment     Past Medical History:   Diagnosis Date    Cervical dysplasia     Female infertility     Last Assessed: 12/23/13    GDM (gestational diabetes mellitus), class A1     Last Assessed: 4/15/16    Gestational diabetes mellitus in pregnancy     with all pregnancy   Resolved: 12/20/17    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 positive    Hypoplasia     breasts    Normal delivery     Obesity     Varicella     had as child    Visual impairment     glasses     Family History   Problem Relation Age of Onset    Alcohol abuse Father     Diabetes type II Maternal Uncle         Uncomplicated, Controlled    Heart disease Maternal Grandfather     Liver cancer Paternal Grandfather     Uterine cancer Maternal Grandmother     Alcohol abuse Paternal Aunt     Heart disease Family     Breast cancer Neg Hx     Colon cancer Neg Hx     Ovarian cancer Neg Hx     Cervical cancer Neg Hx      Past Surgical History:   Procedure Laterality Date    ADENOIDECTOMY      COLPOSCOPY      HYSTEROSCOPY Bilateral 10/2013    Fallopian tube cannulation    WISDOM TOOTH EXTRACTION         Current Outpatient Medications:     escitalopram (LEXAPRO) 10 mg tablet, Take 1 tablet (10 mg total) by mouth daily, Disp: 30 tablet, Rfl: 5    ibuprofen (MOTRIN) 600 mg tablet, Take 1 tablet (600 mg total) by mouth every 6 (six) hours as needed for mild pain for up to 3 days, Disp: 30 tablet, Rfl: 0    No Known Allergies       Lab Review   Lab Requisition on 08/05/2020   Component Date Value    Sodium 08/05/2020 142     Potassium 08/05/2020 4 3     Chloride 08/05/2020 104     CO2 08/05/2020 29     ANION GAP 08/05/2020 9     BUN 08/05/2020 14     Creatinine 08/05/2020 0 84     Glucose, Fasting 08/05/2020 106*    Calcium 08/05/2020 9 2     AST 08/05/2020 14     ALT 08/05/2020 25     Alkaline Phosphatase 08/05/2020 55     Total Protein 08/05/2020 7 5     Albumin 08/05/2020 3 9     Total Bilirubin 08/05/2020 0 80     eGFR 08/05/2020 89     Cholesterol 08/05/2020 196     Triglycerides 08/05/2020 113     HDL, Direct 08/05/2020 51     LDL Calculated 08/05/2020 122*    Non-HDL-Chol (CHOL-HDL) 08/05/2020 145     Hemoglobin A1C 08/05/2020 5 6     EAG 08/05/2020 114     TSH 3RD GENERATON 08/05/2020 2 064         Imaging: No results found  Objective:     Physical Exam  Constitutional:       General: She is not in acute distress  Appearance: She is well-developed  She is not ill-appearing  HENT:      Head: Normocephalic and atraumatic  Neck:      Musculoskeletal: Normal range of motion and neck supple  Cardiovascular:      Rate and Rhythm: Normal rate and regular rhythm  Heart sounds: Normal heart sounds  Pulmonary:      Effort: Pulmonary effort is normal       Breath sounds: Normal breath sounds  Musculoskeletal: Normal range of motion  Skin:     General: Skin is warm and dry     Neurological:      Mental Status: She is alert and oriented to person, place, and time  Deep Tendon Reflexes: Reflexes are normal and symmetric  Psychiatric:         Behavior: Behavior normal          Thought Content: Thought content normal          Judgment: Judgment normal            There are no Patient Instructions on file for this visit  RAHEEL Aguilar    Portions of the record may have been created with voice recognition software  Occasional wrong word or "sound a like" substitutions may have occurred due to the inherent limitations of voice recognition software  Read the chart carefully and recognize, using context, where substitutions have occurred

## 2021-01-25 ENCOUNTER — IMMUNIZATIONS (OUTPATIENT)
Dept: FAMILY MEDICINE CLINIC | Facility: HOSPITAL | Age: 41
End: 2021-01-25

## 2021-01-25 DIAGNOSIS — Z23 ENCOUNTER FOR IMMUNIZATION: Primary | ICD-10-CM

## 2021-01-25 PROCEDURE — 0012A SARS-COV-2 / COVID-19 MRNA VACCINE (MODERNA) 100 MCG: CPT

## 2021-01-25 PROCEDURE — 91301 SARS-COV-2 / COVID-19 MRNA VACCINE (MODERNA) 100 MCG: CPT

## 2021-05-14 ENCOUNTER — APPOINTMENT (OUTPATIENT)
Dept: LAB | Facility: HOSPITAL | Age: 41
End: 2021-05-14
Payer: COMMERCIAL

## 2021-05-14 DIAGNOSIS — F41.9 ANXIETY: ICD-10-CM

## 2021-05-14 DIAGNOSIS — R06.83 SNORING: ICD-10-CM

## 2021-05-14 DIAGNOSIS — Z00.8 ENCOUNTER FOR OTHER GENERAL EXAMINATION: ICD-10-CM

## 2021-05-14 DIAGNOSIS — Z00.8 ENCOUNTER FOR OTHER GENERAL EXAMINATION: Primary | ICD-10-CM

## 2021-05-14 DIAGNOSIS — R73.01 IFG (IMPAIRED FASTING GLUCOSE): ICD-10-CM

## 2021-05-14 DIAGNOSIS — E78.2 MIXED HYPERLIPIDEMIA: ICD-10-CM

## 2021-05-14 LAB
ALBUMIN SERPL BCP-MCNC: 3.7 G/DL (ref 3.5–5)
ALP SERPL-CCNC: 67 U/L (ref 46–116)
ALT SERPL W P-5'-P-CCNC: 20 U/L (ref 12–78)
ANION GAP SERPL CALCULATED.3IONS-SCNC: 8 MMOL/L (ref 4–13)
AST SERPL W P-5'-P-CCNC: 14 U/L (ref 5–45)
BASOPHILS # BLD AUTO: 0.09 THOUSANDS/ΜL (ref 0–0.1)
BASOPHILS NFR BLD AUTO: 2 % (ref 0–1)
BILIRUB SERPL-MCNC: 0.64 MG/DL (ref 0.2–1)
BUN SERPL-MCNC: 13 MG/DL (ref 5–25)
CALCIUM SERPL-MCNC: 9.1 MG/DL (ref 8.3–10.1)
CHLORIDE SERPL-SCNC: 103 MMOL/L (ref 100–108)
CHOLEST SERPL-MCNC: 197 MG/DL (ref 50–200)
CO2 SERPL-SCNC: 28 MMOL/L (ref 21–32)
CREAT SERPL-MCNC: 0.72 MG/DL (ref 0.6–1.3)
EOSINOPHIL # BLD AUTO: 0.04 THOUSAND/ΜL (ref 0–0.61)
EOSINOPHIL NFR BLD AUTO: 1 % (ref 0–6)
ERYTHROCYTE [DISTWIDTH] IN BLOOD BY AUTOMATED COUNT: 12.3 % (ref 11.6–15.1)
EST. AVERAGE GLUCOSE BLD GHB EST-MCNC: 108 MG/DL
GFR SERPL CREATININE-BSD FRML MDRD: 107 ML/MIN/1.73SQ M
GLUCOSE P FAST SERPL-MCNC: 103 MG/DL (ref 65–99)
HBA1C MFR BLD: 5.4 %
HCT VFR BLD AUTO: 40 % (ref 34.8–46.1)
HDLC SERPL-MCNC: 55 MG/DL
HGB BLD-MCNC: 13.4 G/DL (ref 11.5–15.4)
IMM GRANULOCYTES # BLD AUTO: 0.01 THOUSAND/UL (ref 0–0.2)
IMM GRANULOCYTES NFR BLD AUTO: 0 % (ref 0–2)
LDLC SERPL CALC-MCNC: 113 MG/DL (ref 0–100)
LYMPHOCYTES # BLD AUTO: 1.8 THOUSANDS/ΜL (ref 0.6–4.47)
LYMPHOCYTES NFR BLD AUTO: 29 % (ref 14–44)
MCH RBC QN AUTO: 29.2 PG (ref 26.8–34.3)
MCHC RBC AUTO-ENTMCNC: 33.5 G/DL (ref 31.4–37.4)
MCV RBC AUTO: 87 FL (ref 82–98)
MONOCYTES # BLD AUTO: 0.47 THOUSAND/ΜL (ref 0.17–1.22)
MONOCYTES NFR BLD AUTO: 8 % (ref 4–12)
NEUTROPHILS # BLD AUTO: 3.71 THOUSANDS/ΜL (ref 1.85–7.62)
NEUTS SEG NFR BLD AUTO: 60 % (ref 43–75)
NONHDLC SERPL-MCNC: 142 MG/DL
NRBC BLD AUTO-RTO: 0 /100 WBCS
PLATELET # BLD AUTO: 253 THOUSANDS/UL (ref 149–390)
PMV BLD AUTO: 10.5 FL (ref 8.9–12.7)
POTASSIUM SERPL-SCNC: 4.4 MMOL/L (ref 3.5–5.3)
PROT SERPL-MCNC: 7.6 G/DL (ref 6.4–8.2)
RBC # BLD AUTO: 4.59 MILLION/UL (ref 3.81–5.12)
SODIUM SERPL-SCNC: 139 MMOL/L (ref 136–145)
TRIGL SERPL-MCNC: 146 MG/DL
TSH SERPL DL<=0.05 MIU/L-ACNC: 3.86 UIU/ML (ref 0.36–3.74)
WBC # BLD AUTO: 6.12 THOUSAND/UL (ref 4.31–10.16)

## 2021-05-14 PROCEDURE — 85025 COMPLETE CBC W/AUTO DIFF WBC: CPT

## 2021-05-14 PROCEDURE — 83036 HEMOGLOBIN GLYCOSYLATED A1C: CPT

## 2021-05-14 PROCEDURE — 84443 ASSAY THYROID STIM HORMONE: CPT

## 2021-05-14 PROCEDURE — 80061 LIPID PANEL: CPT

## 2021-05-14 PROCEDURE — 36415 COLL VENOUS BLD VENIPUNCTURE: CPT

## 2021-05-14 PROCEDURE — 80053 COMPREHEN METABOLIC PANEL: CPT

## 2021-05-30 DIAGNOSIS — F32.4 MAJOR DEPRESSIVE DISORDER WITH SINGLE EPISODE, IN PARTIAL REMISSION (HCC): ICD-10-CM

## 2021-06-01 RX ORDER — ESCITALOPRAM OXALATE 10 MG/1
TABLET ORAL
Qty: 30 TABLET | Refills: 5 | Status: SHIPPED | OUTPATIENT
Start: 2021-06-01 | End: 2022-02-28

## 2021-11-17 ENCOUNTER — IMMUNIZATIONS (OUTPATIENT)
Dept: FAMILY MEDICINE CLINIC | Facility: HOSPITAL | Age: 41
End: 2021-11-17

## 2021-11-17 DIAGNOSIS — Z23 ENCOUNTER FOR IMMUNIZATION: Primary | ICD-10-CM

## 2021-11-17 PROCEDURE — 91306 COVID-19 MODERNA VACC 0.25 ML BOOSTER: CPT

## 2021-11-17 PROCEDURE — 0064A COVID-19 MODERNA VACC 0.25 ML BOOSTER: CPT

## 2022-02-28 DIAGNOSIS — F32.4 MAJOR DEPRESSIVE DISORDER WITH SINGLE EPISODE, IN PARTIAL REMISSION (HCC): ICD-10-CM

## 2022-02-28 RX ORDER — ESCITALOPRAM OXALATE 10 MG/1
TABLET ORAL
Qty: 30 TABLET | Refills: 0 | Status: SHIPPED | OUTPATIENT
Start: 2022-02-28 | End: 2022-03-23

## 2022-03-04 NOTE — TELEPHONE ENCOUNTER
Informed patient she will need labs done prior to her next appointment and before renewing her prescriptions    She said she will call back at another time, she was at work

## 2022-03-23 DIAGNOSIS — F32.4 MAJOR DEPRESSIVE DISORDER WITH SINGLE EPISODE, IN PARTIAL REMISSION (HCC): ICD-10-CM

## 2022-03-23 RX ORDER — ESCITALOPRAM OXALATE 10 MG/1
TABLET ORAL
Qty: 30 TABLET | Refills: 0 | Status: SHIPPED | OUTPATIENT
Start: 2022-03-23 | End: 2022-06-08 | Stop reason: SDUPTHER

## 2022-06-08 ENCOUNTER — OFFICE VISIT (OUTPATIENT)
Dept: FAMILY MEDICINE CLINIC | Facility: CLINIC | Age: 42
End: 2022-06-08
Payer: COMMERCIAL

## 2022-06-08 VITALS
HEIGHT: 64 IN | TEMPERATURE: 97 F | HEART RATE: 76 BPM | SYSTOLIC BLOOD PRESSURE: 120 MMHG | WEIGHT: 193 LBS | OXYGEN SATURATION: 98 % | DIASTOLIC BLOOD PRESSURE: 78 MMHG | BODY MASS INDEX: 32.95 KG/M2

## 2022-06-08 DIAGNOSIS — Z00.00 ANNUAL PHYSICAL EXAM: Primary | ICD-10-CM

## 2022-06-08 DIAGNOSIS — F32.4 MAJOR DEPRESSIVE DISORDER WITH SINGLE EPISODE, IN PARTIAL REMISSION (HCC): ICD-10-CM

## 2022-06-08 PROCEDURE — 3725F SCREEN DEPRESSION PERFORMED: CPT | Performed by: FAMILY MEDICINE

## 2022-06-08 PROCEDURE — 1036F TOBACCO NON-USER: CPT | Performed by: FAMILY MEDICINE

## 2022-06-08 PROCEDURE — 99395 PREV VISIT EST AGE 18-39: CPT | Performed by: FAMILY MEDICINE

## 2022-06-08 PROCEDURE — 3008F BODY MASS INDEX DOCD: CPT | Performed by: FAMILY MEDICINE

## 2022-06-08 RX ORDER — ESCITALOPRAM OXALATE 10 MG/1
10 TABLET ORAL DAILY
Qty: 90 TABLET | Refills: 1 | Status: SHIPPED | OUTPATIENT
Start: 2022-06-08

## 2022-06-08 NOTE — PATIENT INSTRUCTIONS

## 2022-06-08 NOTE — PROGRESS NOTES
[facility] [address] [place] [address]    NAME: Mike Rao  AGE: 46 y o  SEX: female  : 1980     DATE: 2022     Assessment and Plan:     Problem List Items Addressed This Visit    None         Immunizations and preventive care screenings were discussed with patient today  Appropriate education was printed on patient's after visit summary  Counseling:  Alcohol/drug use: discussed moderation in alcohol intake, the recommendations for healthy alcohol use, and avoidance of illicit drug use  Dental Health: discussed importance of regular tooth brushing, flossing, and dental visits  · Exercise: the importance of regular exercise/physical activity was discussed  Recommend exercise 3-5 times per week for at least 30 minutes  BMI Counseling: Body mass index is 33 13 kg/m²  The BMI is above normal  Nutrition recommendations include decreasing portion sizes, decreasing fast food intake, limiting drinks that contain sugar, moderation in carbohydrate intake, increasing intake of lean protein, reducing intake of saturated and trans fat and reducing intake of cholesterol  Exercise recommendations include moderate physical activity 150 minutes/week and exercising 3-5 times per week  No pharmacotherapy was ordered  Rationale for BMI follow-up plan is due to patient being overweight or obese  depression , doing well   Continues with the [id number] , , has found it beneficial has attempted to d/c in the past but eventually decided to continue   Will be leaving [employer] , going to work for [employer] , 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  No follow-ups on file  Chief Complaint:     Chief Complaint   Patient presents with    Physical Exam      History of Present Illness:     Adult Annual Physical   Patient here for a comprehensive physical exam  The patient reports no problems      Diet and Physical Activity  · Diet/Nutrition: limited junk food and could be improved   · Exercise: no formal exercise  Depression Screening  PHQ-2/9 Depression Screening         General Health  · Sleep: sleeps well  · Hearing: normal - none   · Vision: wears glasses  · Dental: no dental visits for >1 year  Review of Systems:     Review of Systems   Constitutional: Negative for appetite change, chills, fever and unexpected weight change  HENT: Negative for congestion, dental problem, ear pain, hearing loss, postnasal drip, rhinorrhea, sinus pressure, sinus pain, sneezing, sore throat, tinnitus and voice change  Eyes: Negative for visual disturbance  Respiratory: Negative for apnea, cough, chest tightness and shortness of breath  Cardiovascular: Negative for chest pain, palpitations and leg swelling  Gastrointestinal: Negative for abdominal pain, blood in stool, constipation, diarrhea, nausea and vomiting  Endocrine: Negative for cold intolerance, heat intolerance, polydipsia, polyphagia and polyuria  Genitourinary: Negative for decreased urine volume, difficulty urinating, dysuria, frequency and hematuria  Musculoskeletal: Negative for arthralgias, back pain, gait problem, joint swelling and myalgias  Skin: Negative for color change, rash and wound  Allergic/Immunologic: Negative for environmental allergies and food allergies  Neurological: Negative for dizziness, syncope, weakness, light-headedness, numbness and headaches  Hematological: Negative for adenopathy  Does not bruise/bleed easily  Psychiatric/Behavioral: Negative for sleep disturbance and suicidal ideas  The patient is not nervous/anxious         Past Medical History:     Past Medical History:   Diagnosis Date    Cervical dysplasia     Female infertility     Last Assessed: 12/23/13    GDM (gestational diabetes mellitus), class A1     Last Assessed: 4/15/16    Gestational diabetes mellitus in pregnancy with all pregnancy   Resolved: 12/20/17    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 positive    Hypoplasia     breasts    Normal delivery     Obesity     Varicella     had as child    Visual impairment     glasses      Past Surgical History:     Past Surgical History:   Procedure Laterality Date    ADENOIDECTOMY      COLPOSCOPY      HYSTEROSCOPY Bilateral 10/2013    Fallopian tube cannulation    WISDOM TOOTH EXTRACTION        Social History:     Social History     Socioeconomic History    Marital status: /Civil Union     Spouse name: None    Number of children: None    Years of education: None    Highest education level: None   Occupational History    None   Tobacco Use    Smoking status: Never Smoker    Smokeless tobacco: Never Used   Substance and Sexual Activity    Alcohol use: No    Drug use: No    Sexual activity: Yes     Partners: Male     Birth control/protection: Male Sterilization   Other Topics Concern    None   Social History Narrative    Per [employer]: Being a social drinker    Full-time employment    Uses safety equipment     Social Determinants of Health     Financial Resource Strain: Not on file   Food Insecurity: Not on file   Transportation Needs: Not on file   Physical Activity: Not on file   Stress: Not on file   Social Connections: Not on file   Intimate Partner Violence: Not on file   Housing Stability: Not on file      Family History:     Family History   Problem Relation Age of Onset    Alcohol abuse Father     Diabetes type II Maternal Uncle         Uncomplicated, Controlled    Heart disease Maternal Grandfather     Liver cancer Paternal Grandfather     Uterine cancer Maternal Grandmother     Alcohol abuse Paternal Aunt     Heart disease Family     Breast cancer Neg Hx     Colon cancer Neg Hx     Ovarian cancer Neg Hx     Cervical cancer Neg Hx       Current Medications:     Current Outpatient Medications   Medication Sig Dispense Refill    escitalopram (LEXAPRO) 10 mg tablet TAKE 1 TABLET BY MOUTH EVERY DAY 30 tablet 0     No current facility-administered medications for this visit  Allergies:     No Known Allergies   Physical Exam:     /78   Pulse 76   Temp (!) 97 °F (36 1 °C)   Ht 5' 4" (1 626 m)   Wt 87 5 kg (193 lb)   SpO2 98%   BMI 33 13 kg/m²     Physical Exam  Vitals and nursing note reviewed  Constitutional:       General: She is not in acute distress  Appearance: She is well-developed  She is not ill-appearing  HENT:      Head: Normocephalic and atraumatic  Eyes:      Conjunctiva/sclera: Conjunctivae normal    Cardiovascular:      Rate and Rhythm: Normal rate and regular rhythm  Heart sounds: No murmur heard  Pulmonary:      Effort: Pulmonary effort is normal  No respiratory distress  Breath sounds: Normal breath sounds  Abdominal:      Tenderness: There is no abdominal tenderness  Musculoskeletal:      Cervical back: Neck supple  Skin:     General: Skin is warm and dry  Findings: No lesion or rash  Neurological:      Mental Status: She is alert and oriented to person, place, and time  Psychiatric:         Attention and Perception: Attention and perception normal          Mood and Affect: Mood and affect normal          Speech: Speech normal          Behavior: Behavior normal          Thought Content:  Thought content normal          Cognition and Memory: Cognition and memory normal          Judgment: Judgment normal           [facility], [address] [facility]

## 2023-01-16 DIAGNOSIS — F32.4 MAJOR DEPRESSIVE DISORDER WITH SINGLE EPISODE, IN PARTIAL REMISSION (HCC): ICD-10-CM

## 2023-01-16 RX ORDER — ESCITALOPRAM OXALATE 10 MG/1
TABLET ORAL
Qty: 30 TABLET | Refills: 0 | Status: SHIPPED | OUTPATIENT
Start: 2023-01-16

## 2023-12-10 DIAGNOSIS — Z00.6 ENCOUNTER FOR EXAMINATION FOR NORMAL COMPARISON OR CONTROL IN CLINICAL RESEARCH PROGRAM: ICD-10-CM

## 2024-09-17 ENCOUNTER — VBI (OUTPATIENT)
Dept: ADMINISTRATIVE | Facility: OTHER | Age: 44
End: 2024-09-17

## 2024-09-17 NOTE — TELEPHONE ENCOUNTER
09/17/24 1:29 PM     Chart reviewed for Pap Smear (HPV) aka Cervical Cancer Screening was/were not submitted to the patient's insurance.     Manjula Martínez MA   [facility]

## 2024-09-19 ENCOUNTER — OFFICE VISIT (OUTPATIENT)
Dept: FAMILY MEDICINE CLINIC | Facility: CLINIC | Age: 44
End: 2024-09-19
Payer: COMMERCIAL

## 2024-09-19 VITALS
WEIGHT: 195.6 LBS | SYSTOLIC BLOOD PRESSURE: 126 MMHG | HEART RATE: 96 BPM | OXYGEN SATURATION: 98 % | RESPIRATION RATE: 18 BRPM | HEIGHT: 64 IN | DIASTOLIC BLOOD PRESSURE: 88 MMHG | BODY MASS INDEX: 33.39 KG/M2

## 2024-09-19 DIAGNOSIS — K21.9 GASTROESOPHAGEAL REFLUX DISEASE WITHOUT ESOPHAGITIS: ICD-10-CM

## 2024-09-19 DIAGNOSIS — L98.9 BENIGN SKIN GROWTH: ICD-10-CM

## 2024-09-19 DIAGNOSIS — Z11.59 NEED FOR HEPATITIS C SCREENING TEST: ICD-10-CM

## 2024-09-19 DIAGNOSIS — Z00.00 ANNUAL PHYSICAL EXAM: Primary | ICD-10-CM

## 2024-09-19 DIAGNOSIS — Z12.31 ENCOUNTER FOR SCREENING MAMMOGRAM FOR BREAST CANCER: ICD-10-CM

## 2024-09-19 PROCEDURE — 99214 OFFICE O/P EST MOD 30 MIN: CPT | Performed by: NURSE PRACTITIONER

## 2024-09-19 PROCEDURE — 99396 PREV VISIT EST AGE 40-64: CPT | Performed by: NURSE PRACTITIONER

## 2024-09-19 RX ORDER — FAMOTIDINE 20 MG/1
20 TABLET, FILM COATED ORAL DAILY
Qty: 30 TABLET | Refills: 2 | Status: SHIPPED | OUTPATIENT
Start: 2024-09-19 | End: 2025-09-14

## 2024-09-19 NOTE — ASSESSMENT & PLAN NOTE
To begin log to help identify triggers.  Advised to avoid excess caffeine, fatty foods, or spicy foods.  To follow-up if no improvement in symptoms in 1 month.  Orders:    famotidine (PEPCID) 20 mg tablet; Take 1 tablet (20 mg total) by mouth daily

## 2024-09-19 NOTE — PROGRESS NOTES
Adult Annual Physical  Name: Faustina Mosqueda      : 1980      MRN: [id number]  Encounter Provider: RAHEEL Najera  Encounter Date: 2024   Encounter department: [facility] [address] [place] [address]    Assessment & Plan  Annual physical exam  To keep upcoming appointment for Pap test.  To make appointment for dental cleaning and eye exam.       Gastroesophageal reflux disease without esophagitis  To begin log to help identify triggers.  Advised to avoid excess caffeine, fatty foods, or spicy foods.  To follow-up if no improvement in symptoms in 1 month.  Orders:    famotidine (PEPCID) 20 mg tablet; Take 1 tablet (20 mg total) by mouth daily    Benign skin growth  Provided referral to surgery for excision.  Orders:    Ambulatory Referral to General Surgery; Future    BMI 33.0-33.9,adult  Counseled on the importance of weight loss, healthy food choices, engaging in daily physical activity, and reducing BMI.   To obtain labs, will call with results.  Orders:    CBC and differential; Future    Comprehensive metabolic panel; Future    Lipid Panel with Direct LDL reflex; Future    TSH, 3rd generation with Free T4 reflex; Future    Encounter for screening mammogram for breast cancer    Orders:    Mammo screening bilateral w 3d and cad; Future    Need for hepatitis C screening test    Orders:    Hepatitis C Antibody; Future    Immunizations and preventive care screenings were discussed with patient today. Appropriate education was printed on patient's after visit summary.    Counseling:  Alcohol/drug use: discussed moderation in alcohol intake, the recommendations for healthy alcohol use, and avoidance of illicit drug use.  Dental Health: discussed importance of regular tooth brushing, flossing, and dental visits.  Injury prevention: discussed safety/seat belts, safety helmets, smoke detectors, carbon dioxide detectors, and smoking near bedding or upholstery.  Sexual health: discussed  sexually transmitted diseases, partner selection, use of condoms, avoidance of unintended pregnancy, and contraceptive alternatives.  Exercise: the importance of regular exercise/physical activity was discussed. Recommend exercise 3-5 times per week for at least 30 minutes.          History of Present Illness     Adult Annual Physical:  Patient presents for annual physical. Faustina presents for a physical.  She reports having history of heartburn but feels it worsened within the last year.  She tries to avoid spicy foods and sleep with her head elevated.  She treats her symptoms with Mylanta with minimal improvement.  Denies weight loss, nausea, vomiting, blood in stool, abdominal pain, or difficulty swallowing.  She also noted a growth on her lower extremity for the past year and would like it removed.  .     Diet and Physical Activity:  - Diet/Nutrition: well balanced diet.  - Exercise: walking, 5-7 times a week on average and 30-60 minutes on average.    Depression Screening:    - PHQ-9 Score: 1    General Health:  - Sleep: 7-8 hours of sleep on average.  - Hearing: normal hearing bilateral ears.  - Vision: wears glasses and most recent eye exam > 1 year ago.  - Dental: no dental visits for > 1 year.    /GYN Health:    - Menopause: premenopausal.   - Last menstrual cycle: 8/26/2024.   - Contraception: male partner had vasectomy.      Advanced Care Planning:  - Has an advanced directive?: no    - Has a durable medical POA?: no    - ACP document given to patient?: yes      Review of Systems   Constitutional: Negative.    HENT: Negative.     Eyes: Negative.    Respiratory: Negative.     Cardiovascular: Negative.    Gastrointestinal:         Heart burn   Endocrine: Negative.    Genitourinary: Negative.    Musculoskeletal: Negative.    Skin:         Growth on skin   Allergic/Immunologic: Negative.    Neurological: Negative.    Hematological: Negative.    Psychiatric/Behavioral: Negative.       Current Outpatient  "Medications on File Prior to Visit   Medication Sig Dispense Refill    [DISCONTINUED] escitalopram (LEXAPRO) 10 mg tablet TAKE 1 TABLET BY MOUTH EVERY DAY 30 tablet 0     No current facility-administered medications on file prior to visit.        Objective     /88   Pulse 96   Resp 18   Ht 5' 4\" (1.626 m)   Wt 88.7 kg (195 lb 9.6 oz)   SpO2 98%   BMI 33.57 kg/m²     Physical Exam  Vitals and nursing note reviewed.   Constitutional:       General: She is not in acute distress.     Appearance: Normal appearance. She is well-developed. She is not ill-appearing, toxic-appearing or diaphoretic.   HENT:      Head: Normocephalic and atraumatic.      Right Ear: Tympanic membrane and external ear normal.      Left Ear: Tympanic membrane and external ear normal.      Nose: Nose normal.      Mouth/Throat:      Mouth: Mucous membranes are moist.      Pharynx: Oropharynx is clear. No oropharyngeal exudate.   Eyes:      Conjunctiva/sclera: Conjunctivae normal.      Pupils: Pupils are equal, round, and reactive to light.   Neck:      Thyroid: No thyromegaly.   Cardiovascular:      Rate and Rhythm: Normal rate and regular rhythm.      Pulses: Normal pulses.      Heart sounds: Normal heart sounds. No murmur heard.  Pulmonary:      Effort: Pulmonary effort is normal. No respiratory distress.      Breath sounds: Normal breath sounds. No stridor. No wheezing or rales.   Chest:      Chest wall: No tenderness.   Abdominal:      General: Bowel sounds are normal. There is no distension.      Palpations: Abdomen is soft. There is no mass.      Tenderness: There is no abdominal tenderness. There is no guarding or rebound.      Hernia: No hernia is present.   Musculoskeletal:         General: Normal range of motion.      Cervical back: Normal range of motion and neck supple.   Lymphadenopathy:      Cervical: No cervical adenopathy.   Skin:     General: Skin is warm and dry.      Capillary Refill: Capillary refill takes less than 2 " seconds.          Neurological:      General: No focal deficit present.      Mental Status: She is alert and oriented to person, place, and time.      Cranial Nerves: No cranial nerve deficit.      Gait: Gait normal.   Psychiatric:         Mood and Affect: Mood normal.         Behavior: Behavior normal.         Thought Content: Thought content normal.         Judgment: Judgment normal.       Administrative Statements   I have spent a total time of 30 minutes in caring for this patient on the day of the visit/encounter including Diagnostic results, Prognosis, Risks and benefits of tx options, Instructions for management, Patient and family education, Importance of tx compliance, Risk factor reductions, Impressions, Counseling / Coordination of care, Documenting in the medical record, Reviewing / ordering tests, medicine, procedures  , and Obtaining or reviewing history  .

## 2024-09-19 NOTE — ASSESSMENT & PLAN NOTE
To keep upcoming appointment for Pap test.  To make appointment for dental cleaning and eye exam.

## 2024-10-13 DIAGNOSIS — K21.9 GASTROESOPHAGEAL REFLUX DISEASE WITHOUT ESOPHAGITIS: ICD-10-CM

## 2024-10-14 RX ORDER — FAMOTIDINE 20 MG/1
20 TABLET, FILM COATED ORAL DAILY
Qty: 90 TABLET | Refills: 1 | Status: SHIPPED | OUTPATIENT
Start: 2024-10-14

## 2024-10-22 ENCOUNTER — CONSULT (OUTPATIENT)
Dept: SURGERY | Facility: CLINIC | Age: 44
End: 2024-10-22
Payer: COMMERCIAL

## 2024-10-22 VITALS
RESPIRATION RATE: 18 BRPM | SYSTOLIC BLOOD PRESSURE: 126 MMHG | TEMPERATURE: 97.7 F | BODY MASS INDEX: 33.46 KG/M2 | OXYGEN SATURATION: 98 % | DIASTOLIC BLOOD PRESSURE: 78 MMHG | WEIGHT: 196 LBS | HEART RATE: 103 BPM | HEIGHT: 64 IN

## 2024-10-22 DIAGNOSIS — L98.9 BENIGN SKIN GROWTH: ICD-10-CM

## 2024-10-22 PROCEDURE — 99203 OFFICE O/P NEW LOW 30 MIN: CPT | Performed by: STUDENT IN AN ORGANIZED HEALTH CARE EDUCATION/TRAINING PROGRAM

## 2024-10-22 RX ORDER — CHLORHEXIDINE GLUCONATE 40 MG/ML
SOLUTION TOPICAL DAILY PRN
OUTPATIENT
Start: 2024-10-22

## 2024-10-22 NOTE — PROGRESS NOTES
Ambulatory Visit  Name: Faustina Mosqueda      : 1980      MRN: [id number]  Encounter Provider: Agustín Eubanks DO  Encounter Date: 10/22/2024   Encounter department: [facility] [place]    Assessment & Plan  Benign skin growth  44-year-old female with left posterior thigh growth, most likely a large skin tag  -Patient notes a bump on her posterior left thigh  -Has noticed it for 9 months and has slowly gotten bigger  -Does cause some irritation and discomfort with certain movements or positions  -Large skin tag on the left posterior thigh which is pedunculated and has a base roughly 2 x 1 cm  -Primary care physician note from 2024 reviewed  -Plan for excision of left lower extremity mass under local anesthesia    All risks, benefits, alternatives of the procedure were discussed at length with the patient.  Risks include bleeding, infection, damage to surrounding structures, recurrence.  All questions were answered to satisfaction.  The patient voiced understanding and signed consent.    Orders:    Ambulatory Referral to General Surgery    Case request operating room: EXCISION BIOPSY TISSUE LESION/MASS LOWER EXTREMITY; Standing    Case request operating room: EXCISION BIOPSY TISSUE LESION/MASS LOWER EXTREMITY      History of Present Illness     Faustina Mosqueda is a 44 y.o. female who presents for evaluation of a left posterior thigh mass.  She states it has been there for roughly 9 months and is causing her irritation.  There is pulling and discomfort with certain positions or movements.    History obtained from : patient  Review of Systems   Constitutional:  Negative for chills, fatigue and fever.   HENT:  Negative for congestion, hearing loss, rhinorrhea and sore throat.    Eyes:  Negative for pain and discharge.   Respiratory:  Negative for cough, chest tightness and shortness of breath.    Cardiovascular:  Negative for chest pain and palpitations.   Gastrointestinal:  Negative for  abdominal pain, constipation, diarrhea, nausea and vomiting.   Endocrine: Negative for cold intolerance and heat intolerance.   Genitourinary:  Negative for difficulty urinating and dysuria.   Musculoskeletal:  Negative for back pain and neck pain.   Skin:  Negative for color change and rash.        Positive mass posterior left thigh   Allergic/Immunologic: Negative for environmental allergies and food allergies.   Neurological:  Negative for seizures and headaches.   Hematological:  Does not bruise/bleed easily.   Psychiatric/Behavioral:  Negative for confusion and hallucinations.      Medical History Reviewed by provider this encounter:  Tobacco  Allergies  Meds  Problems  Med Hx  Surg Hx  Fam Hx       Past Medical History   Past Medical History:   Diagnosis Date    Cervical dysplasia     Female infertility     Last Assessed: 12/23/13    GDM (gestational diabetes mellitus), class A1     Last Assessed: 4/15/16    Gestational diabetes mellitus in pregnancy     with all pregnancy. 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    Hypoplasia     breasts    Normal delivery     Obesity     Varicella     had as child    Visual impairment     glasses     Past Surgical History:   Procedure Laterality Date    ADENOIDECTOMY      COLPOSCOPY      HYSTEROSCOPY Bilateral 10/2013    Fallopian tube cannulation    WISDOM TOOTH EXTRACTION       Family History   Problem Relation Age of Onset    Alcohol abuse Father     Diabetes type II Maternal Uncle         Uncomplicated, Controlled    Heart disease Maternal Grandfather     Liver cancer Paternal Grandfather     Uterine cancer Maternal Grandmother     Alcohol abuse Paternal Aunt     Heart disease Family     Breast cancer Neg Hx     Colon cancer Neg Hx     Ovarian cancer Neg Hx     Cervical cancer Neg Hx      Current Outpatient Medications on File Prior to Visit   Medication Sig Dispense Refill    famotidine (PEPCID) 20 mg tablet TAKE 1 TABLET BY MOUTH EVERY DAY 90  "tablet 1     No current facility-administered medications on file prior to visit.   No Known Allergies   Current Outpatient Medications on File Prior to Visit   Medication Sig Dispense Refill    famotidine (PEPCID) 20 mg tablet TAKE 1 TABLET BY MOUTH EVERY DAY 90 tablet 1     No current facility-administered medications on file prior to visit.      Social History     Tobacco Use    Smoking status: Never     Passive exposure: Never    Smokeless tobacco: Never   Vaping Use    Vaping status: Never Used   Substance and Sexual Activity    Alcohol use: No    Drug use: No    Sexual activity: Yes     Partners: Male     Birth control/protection: Male Sterilization         Objective     /78 (BP Location: Left arm, Patient Position: Sitting, Cuff Size: Standard)   Pulse 103   Temp 97.7 °F (36.5 °C)   Resp 18   Ht 5' 4\" (1.626 m)   Wt 88.9 kg (196 lb)   SpO2 98%   BMI 33.64 kg/m²     Physical Exam  Constitutional:       Appearance: Normal appearance.   HENT:      Head: Normocephalic and atraumatic.      Nose: Nose normal.   Eyes:      General: No scleral icterus.     Conjunctiva/sclera: Conjunctivae normal.   Cardiovascular:      Rate and Rhythm: Regular rhythm. Tachycardia present.      Heart sounds: Normal heart sounds.   Pulmonary:      Effort: Pulmonary effort is normal.      Breath sounds: Normal breath sounds.   Abdominal:      General: There is no distension.      Palpations: Abdomen is soft.      Tenderness: There is no abdominal tenderness.   Musculoskeletal:         General: No signs of injury.   Skin:     General: Skin is warm.      Coloration: Skin is not jaundiced.      Comments: Large skin tag on the left posterior thigh which is pedunculated and has a base roughly 2 x 1 cm   Neurological:      General: No focal deficit present.      Mental Status: She is alert and oriented to person, place, and time.   Psychiatric:         Mood and Affect: Mood normal.         Behavior: Behavior normal.         "

## 2024-10-22 NOTE — ASSESSMENT & PLAN NOTE
44-year-old female with left posterior thigh growth, most likely a large skin tag  -Patient notes a bump on her posterior left thigh  -Has noticed it for 9 months and has slowly gotten bigger  -Does cause some irritation and discomfort with certain movements or positions  -Large skin tag on the left posterior thigh which is pedunculated and has a base roughly 2 x 1 cm  -Primary care physician note from 9/19/2024 reviewed  -Plan for excision of left lower extremity mass under local anesthesia    All risks, benefits, alternatives of the procedure were discussed at length with the patient.  Risks include bleeding, infection, damage to surrounding structures, recurrence.  All questions were answered to satisfaction.  The patient voiced understanding and signed consent.    Orders:    Ambulatory Referral to General Surgery    Case request operating room: EXCISION BIOPSY TISSUE LESION/MASS LOWER EXTREMITY; Standing    Case request operating room: EXCISION BIOPSY TISSUE LESION/MASS LOWER EXTREMITY

## 2024-11-11 ENCOUNTER — OFFICE VISIT (OUTPATIENT)
Dept: OBGYN CLINIC | Facility: CLINIC | Age: 44
End: 2024-11-11
Payer: COMMERCIAL

## 2024-11-11 VITALS
SYSTOLIC BLOOD PRESSURE: 124 MMHG | BODY MASS INDEX: 33.12 KG/M2 | DIASTOLIC BLOOD PRESSURE: 82 MMHG | HEIGHT: 64 IN | WEIGHT: 194 LBS

## 2024-11-11 DIAGNOSIS — Z01.419 ENCOUNTER FOR ANNUAL ROUTINE GYNECOLOGICAL EXAMINATION: Primary | ICD-10-CM

## 2024-11-11 DIAGNOSIS — Z12.31 SCREENING MAMMOGRAM FOR BREAST CANCER: ICD-10-CM

## 2024-11-11 PROBLEM — Z00.00 ANNUAL PHYSICAL EXAM: Status: RESOLVED | Noted: 2019-08-06 | Resolved: 2024-11-11

## 2024-11-11 PROCEDURE — S0610 ANNUAL GYNECOLOGICAL EXAMINA: HCPCS | Performed by: NURSE PRACTITIONER

## 2024-11-11 PROCEDURE — G0476 HPV COMBO ASSAY CA SCREEN: HCPCS | Performed by: NURSE PRACTITIONER

## 2024-11-11 PROCEDURE — G0145 SCR C/V CYTO,THINLAYER,RESCR: HCPCS | Performed by: NURSE PRACTITIONER

## 2024-11-11 NOTE — PROGRESS NOTES
Diagnoses and all orders for this visit:    Encounter for annual routine gynecological examination  -     Liquid-based pap, screening    Screening mammogram for breast cancer  Comments:  Ordered for PCP    Calcium/vit d inclusion in the diet discussed, call with any issues, SBE reinforced, all concerns addressed.         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 She denies any issues with bleeding or her menses. Reports regular cycles, every 4 weeks and they last 3-4 days.  She has a history of abnormal pap smear in 2013, Colpo CIN1. Last Paps 2013 LSIL and Pap 2019 neg/neg, a pap with cotest was done today. Denies vaginal issues. Denies pelvic pain. Vasectomy for BCM. Sexually active without any concerns. Mammogram has been ordered today.    Past Medical History:   Diagnosis Date    Abnormal Pap smear of cervix     Cervical dysplasia     Female infertility     Last Assessed: 12/23/13    GDM (gestational diabetes mellitus), class A1     Last Assessed: 4/15/16    Gestational diabetes mellitus in pregnancy     with all pregnancy. 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    Hypoplasia     breasts    Normal delivery     Obesity     Varicella     had as child    Visual impairment     glasses     Past Surgical History:   Procedure Laterality Date    ADENOIDECTOMY      COLPOSCOPY      HYSTEROSCOPY Bilateral 10/2013    Fallopian tube cannulation    WISDOM TOOTH EXTRACTION       Family History   Problem Relation Age of Onset    Alcohol abuse Father     Diabetes type II Maternal Uncle         Uncomplicated, Controlled    Heart disease Maternal Grandfather     Liver cancer Paternal Grandfather     Uterine cancer Maternal Grandmother     Alcohol abuse Paternal Aunt     Heart disease Family     Breast cancer Neg Hx     Colon cancer Neg Hx     Ovarian cancer Neg Hx     Cervical cancer Neg Hx      Social History     Tobacco Use    Smoking status: Never     Passive exposure:  "Never    Smokeless tobacco: Never   Vaping Use    Vaping status: Never Used   Substance Use Topics    Alcohol use: No    Drug use: No       Current Outpatient Medications:     famotidine (PEPCID) 20 mg tablet, TAKE 1 TABLET BY MOUTH EVERY DAY, Disp: 90 tablet, Rfl: 1  Patient Active Problem List    Diagnosis Date Noted    Benign skin growth 10/22/2024    Gastroesophageal reflux disease without esophagitis 2024    Major depressive disorder with single episode, in partial remission (HCC) 2019    Plantar fasciitis 2019    Arthralgia 2017    Malaise and fatigue 2017    Anxiety 08/15/2017    Vaginal delivery 03/10/2016    Mild cervical dysplasia 2013       No Known Allergies    OB History    Para Term  AB Living   3 3 3     3   SAB IAB Ectopic Multiple Live Births         0 3      # Outcome Date GA Lbr [place]/2nd Weight Sex Type Anes PTL Lv   3 Term 03/10/16 38w4d 01:55 / 00:10 3300 g (7 lb 4.4 oz) M Vag-Spont None N ABDULLAHI      Complications: Gestational diabetes mellitus in childbirth, diet controlled   2 Term 14   2948 g (6 lb 8 oz) F Vag-Spont EPI N ABDULLAHI      Complications: Gestational diabetes   1 Term 11   2776 g (6 lb 1.9 oz) M Vag-Spont EPI N ABDULLAHI      Complications: PIH (pregnancy induced hypertension), Gestational diabetes      Obstetric Comments   Gestational diabetes diet controlled , increase bmi, mild cervical dysplasia      Works for [employer] from home  2 sons 8 and 12  Daughter is 10 yo    Vitals:    24 0811   BP: 124/82   BP Location: Left arm   Patient Position: Sitting   Cuff Size: Standard   Weight: 88 kg (194 lb)   Height: 5' 4\" (1.626 m)       Body mass index is 33.3 kg/m².    Review of Systems   Constitutional: Negative for chills, fatigue, fever and unexpected weight change.   Respiratory: Negative for shortness of breath.    Gastrointestinal: Negative for anal bleeding, blood in stool, constipation and diarrhea.   Genitourinary: " Negative for difficulty urinating, dysuria and hematuria.     Physical Exam   Constitutional: She appears well-developed and well-nourished. No distress.   HENT: atraumatic, EOMI  Head: Normocephalic.   Neck: Normal range of motion. Neck supple.   Pulmonary: Effort normal.  Breasts: bilateral without masses, skin changes or nipple discharge. Bilaterally soft and warm to touch. No areas of erythema or pain.  Abdominal: Soft.   Pelvic exam was performed with patient supine. No labial fusion. There is no rash, tenderness, lesion or injury on the right labia. There is no rash, tenderness, lesion or injury on the left labia. Urethral meatus does not show any tenderness, inflammation or discharge. Palpation of midline bladder without pain or discomfort. Uterus is not deviated, not enlarged, not fixed and not tender. Cervix exhibits no motion tenderness, no discharge and no friability. Right adnexum displays no mass, no tenderness and no fullness. Left adnexum displays no mass, no tenderness and no fullness. No erythema or tenderness in the vagina. No foreign body in the vagina. No signs of injury around the vagina. No vaginal discharge found. No signs of injury around the vagina or anus. Perineum without lesions, signs of injury, erythema or swelling. Large skin tag on left lower buttock.  Lymphadenopathy:        Right: No inguinal adenopathy present.        Left: No inguinal adenopathy present.

## 2024-11-11 NOTE — PATIENT INSTRUCTIONS
Patient Education     Lowering Your Risk of Breast Cancer   About this topic   Breast cancer is a serious illness. Breast cancer is when abnormal cells grow and divide more quickly in your breast. These cells form a growth or tumor. The abnormal cells may enter nearby tissue and spread to other parts of the body. It is the type of cancer most often seen in women. Men can have breast cancer, but it is a rare condition.  General   Some things in your life may increase your risk of breast cancer. You may not be able to change some of these. Others you can control.  You are more likely to get breast cancer if you:  Have a mother, sister, or daughter who has had breast cancer  Have used hormones for menopause for more than 5 years  Have had radiation therapy to the breast or chest in the past  Are overweight or do not exercise  Had your first menstrual period before you were 11 years old  Went through menopause after age 55  Have never been pregnant or had your first child after age 35  Have had breast cancer before  Drink alcohol in any form  Have dense breasts  Are older in age  There is no certain way to prevent breast cancer. There are things you can do to lower your chances of having breast cancer.  Keep a healthy weight. Lose weight if you are overweight. Being overweight raises your chances of having breast cancer.  Eat a healthy diet to maintain a healthy weight, such as more fruits, vegetables, and lean cuts of meat. Decrease the amount of saturated fat in your diet.  Exercise. Being active helps you keep a healthy weight.  Limit your alcohol intake or do not drink alcohol. The more alcohol you drink, the higher your risk.  Do not smoke cigarettes. Smoking can increase your risk of many types of cancer.  Breastfeed your baby. This may help protect you. The longer you breastfeed, the more protection you have.  Talk with your doctor about:  Limiting or stopping hormone therapy.  Taking certain drugs to prevent  breast cancer. For women at high risk of having breast cancer, there are a few drugs that may lower your risk.  Surgery to prevent you from having breast cancer if you are very high risk.  When do I need to call the doctor?   Changes in your breasts  A lump or area in your breast that feels different  Discharge from your nipple  Skin on your breast is dimpled or indented  You have questions or concerns about your breasts  Helpful tips   Talk to your doctor about the best kind of breast cancer screening for you.  If you want to do self breast exams, have your doctor show you the right way to do them.  Tell your doctor of any abnormal finding.  Last Reviewed Date   2021-10-04  Consumer Information Use and Disclaimer   This generalized information is a limited summary of diagnosis, treatment, and/or medication information. It is not meant to be comprehensive and should be used as a tool to help the user understand and/or assess potential diagnostic and treatment options. It does NOT include all information about conditions, treatments, medications, side effects, or risks that may apply to a specific patient. It is not intended to be medical advice or a substitute for the medical advice, diagnosis, or treatment of a health care provider based on the health care provider's examination and assessment of a patient’s specific and unique circumstances. Patients must speak with a health care provider for complete information about their health, medical questions, and treatment options, including any risks or benefits regarding use of medications. This information does not endorse any treatments or medications as safe, effective, or approved for treating a specific patient. Up[employer]Date, Inc[employer] and its affiliates disclaim any warranty or liability relating to this information or the use thereof. The use of this information is governed by the Terms of Use, available at  https://www.wolters[employer] Companyuwer.com/en/know/clinical-effectiveness-terms   Copyright   Copyright © 2024 UpToDate, Inc[employer] and its affiliates and/or licensors. All rights reserved.

## 2024-11-15 LAB
LAB AP GYN PRIMARY INTERPRETATION: NORMAL
Lab: NORMAL

## 2024-11-18 ENCOUNTER — RESULTS FOLLOW-UP (OUTPATIENT)
Age: 44
End: 2024-11-18

## 2024-11-22 ENCOUNTER — HOSPITAL ENCOUNTER (OUTPATIENT)
Facility: HOSPITAL | Age: 44
Setting detail: OUTPATIENT SURGERY
Discharge: HOME | End: 2024-11-22
Attending: STUDENT IN AN ORGANIZED HEALTH CARE EDUCATION/TRAINING PROGRAM | Admitting: STUDENT IN AN ORGANIZED HEALTH CARE EDUCATION/TRAINING PROGRAM
Payer: COMMERCIAL

## 2024-11-22 VITALS
TEMPERATURE: 98 F | SYSTOLIC BLOOD PRESSURE: 138 MMHG | WEIGHT: 195.99 LBS | HEIGHT: 64 IN | DIASTOLIC BLOOD PRESSURE: 90 MMHG | RESPIRATION RATE: 20 BRPM | HEART RATE: 73 BPM | BODY MASS INDEX: 33.46 KG/M2 | OXYGEN SATURATION: 99 %

## 2024-11-22 DIAGNOSIS — L98.9 BENIGN SKIN GROWTH: ICD-10-CM

## 2024-11-22 PROCEDURE — 12032 INTMD RPR S/A/T/EXT 2.6-7.5: CPT | Performed by: STUDENT IN AN ORGANIZED HEALTH CARE EDUCATION/TRAINING PROGRAM

## 2024-11-22 PROCEDURE — 11404 EXC TR-EXT B9+MARG 3.1-4 CM: CPT | Performed by: STUDENT IN AN ORGANIZED HEALTH CARE EDUCATION/TRAINING PROGRAM

## 2024-11-22 PROCEDURE — 88304 TISSUE EXAM BY PATHOLOGIST: CPT | Performed by: STUDENT IN AN ORGANIZED HEALTH CARE EDUCATION/TRAINING PROGRAM

## 2024-11-22 PROCEDURE — NC001 PR NO CHARGE: Performed by: STUDENT IN AN ORGANIZED HEALTH CARE EDUCATION/TRAINING PROGRAM

## 2024-11-22 RX ORDER — ACETAMINOPHEN 325 MG/1
650 TABLET ORAL EVERY 6 HOURS PRN
Status: DISCONTINUED | OUTPATIENT
Start: 2024-11-22 | End: 2024-11-22 | Stop reason: HOSPADM

## 2024-11-22 RX ORDER — MAGNESIUM HYDROXIDE 1200 MG/15ML
LIQUID ORAL AS NEEDED
Status: DISCONTINUED | OUTPATIENT
Start: 2024-11-22 | End: 2024-11-22 | Stop reason: HOSPADM

## 2024-11-22 RX ORDER — CHLORHEXIDINE GLUCONATE 40 MG/ML
SOLUTION TOPICAL DAILY PRN
Status: DISCONTINUED | OUTPATIENT
Start: 2024-11-22 | End: 2024-11-22 | Stop reason: HOSPADM

## 2024-11-22 RX ORDER — CEFAZOLIN SODIUM 2 G/50ML
2000 SOLUTION INTRAVENOUS ONCE
Status: COMPLETED | OUTPATIENT
Start: 2024-11-22 | End: 2024-11-22

## 2024-11-22 RX ADMIN — CEFAZOLIN SODIUM 2000 MG: 2 SOLUTION INTRAVENOUS at 07:50

## 2024-11-22 NOTE — OP NOTE
OPERATIVE REPORT  PATIENT NAME: Faustina Mosqueda    :  1980  MRN: [id number]  Pt Location: [facility] OR ROOM 03    SURGERY DATE: 2024    Surgeons and Role:     * Agustín Eubanks DO - Primary    Preop Diagnosis:  Benign skin growth [L98.9]    Post-Op Diagnosis Codes:     * Benign skin growth [L98.9]    Procedure(s):  Left - EXCISION BIOPSY TISSUE LESION/MASS LOWER EXTREMITY    Specimen(s):  ID Type Source Tests Collected by Time Destination   1 : Left leg mass Tissue Leg, Left TISSUE EXAM Agustín Eubanks  2024 0812        Estimated Blood Loss:   Minimal    Drains:  * No LDAs found *    Anesthesia Type:   Local    Operative Indications:  Benign skin growth [L98.9]    Operative Findings:  Specimen size 3 x 2.5 x 1.5 cm, incision closure 5 cm  Dissection was carried down into subcutaneous tissue and did not go any deeper  5 mm margins were taken    Complications:   None    Procedure and Technique:  The patient was seen again in Preoperative Holding.  All the risks, benefits, complications, treatment options, and expected outcomes were discussed with the patient and family at length. All questions were answered to satisfaction. There was concurrence with the proposed plan and informed consent was obtained. The site of surgery was properly noted/marked. The patient was taken to Operating Room, identified, and the procedure verified as excision of soft tissue mass left lower extremity.    The patient was placed in the left lateral decubitus position on the stretcher.  The patient had received preoperative antibiotics and SCDs placed on the bilateral lower extremities.    The proximal left lower extremity was then prepped and draped in the usual sterile fashion using ChloraPrep.  A Time-Out was then performed with all involved present confirming the correct patient, procedure, antibiotics, and any additional concerns.     An elliptical area was marked around the mass using a marking pen.  5 mm  margins were measured.  Using 1% lidocaine with epinephrine the area around the mass was anesthetized.  Using #15 blade an elliptical incision was made and dissection was carried down into subcutaneous tissue.  The specimen was then excised.  The cavity was inspected and hemostasis was ensured with electrocautery.  The cavity was then irrigated.  Interrupted 3-0 Vicryl was used to approximate the deep dermal tissue.  Running 4-0 Monocryl was used to close the skin.    The incision was then cleansed and dried and Steri-Strips, 4 x 4, Tegaderm was placed as dressing.    All instrument, sponge, and needle counts were noted to be correct at the conclusion of the case.     I was present for the entire procedure. and A qualified resident physician was not available.    Patient Disposition:  APU and hemodynamically stable             SIGNATURE: Agustín Eubanks,   DATE: November 22, 2024  TIME: 8:29 AM

## 2024-11-22 NOTE — H&P
H&P Exam - General Surgery   Faustina Mosqueda 44 y.o. female MRN: [id number]  Unit/Bed#: OR [place] Encounter: [id number]    Assessment & Plan     Faustina Mosqueda is a 44 y.o. female    Benign skin growth  44-year-old female with left posterior thigh growth, most likely a large skin tag  -Patient presents for excision of large skin tag  -Denies any changes  -Plan for excision of left lower extremity mass under local anesthesia  -All questions answered to satisfaction      History of Present Illness   HPI:  Faustina Mosqueda is a 44 y.o. female who presents for excision of left lower extremity mass.  She denies any changes..    Review of Systems  Constitutional: Denies weight change, fever, chills, night sweats, fatigue  HEENT: Denies headaches, hearing change, vision change, nasal congestion, sore throat  Cardiovascular: Denies chest pain, shortness of breath, dyspnea on exertion  Respiratory: Denies cough, dyspnea  Gastrointestinal: Denies nausea, vomiting, abdominal pain; Positive bowel movement, flatus  Genitourinary: Denies dysuria, hematuria  Musculoskeletal: Denies arthralgias, myalgias; positive left lower extremity mass  Neuro: Denies weakness, numbness, loss of consciousness  Heme: Denies easy bruising, bleeding  Endocrine: Denies polyuria, polydipsia    Historical Information   Past Medical History:   Diagnosis Date    Abnormal Pap smear of cervix     Cervical dysplasia     Female infertility     Last Assessed: 12/23/13    GDM (gestational diabetes mellitus), class A1     Last Assessed: 4/15/16    Gestational diabetes mellitus in pregnancy     with all pregnancy. 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    Hypoplasia     breasts    Normal delivery     Obesity     Varicella     had as child    Visual impairment     glasses     Past Surgical History:   Procedure Laterality Date    ADENOIDECTOMY      COLPOSCOPY      HYSTEROSCOPY Bilateral 10/2013    Fallopian tube cannulation    WISDOM TOOTH EXTRACTION  "      Social History   Social History     Substance and Sexual Activity   Alcohol Use No     Social History     Substance and Sexual Activity   Drug Use No     Social History     Tobacco Use   Smoking Status Never    Passive exposure: Never   Smokeless Tobacco Never     Family History: Family history non-contributory    Meds/Allergies   all medications and allergies reviewed  No Known Allergies    Objective   First Vitals:   Blood Pressure: 141/90 (11/22/24 0655)  Pulse: 86 (11/22/24 0655)  Temperature: 97.8 °F (36.6 °C) (11/22/24 0655)  Temp Source: Temporal (11/22/24 0655)  Respirations: 20 (11/22/24 0655)  Height: 5' 4\" (162.6 cm) (11/22/24 0655)  Weight - Scale: 88.9 kg (195 lb 15.8 oz) (11/22/24 0655)  SpO2: 98 % (11/22/24 0655)    Current Vitals:   Blood Pressure: 141/90 (11/22/24 0655)  Pulse: 86 (11/22/24 0655)  Temperature: 97.8 °F (36.6 °C) (11/22/24 0655)  Temp Source: Temporal (11/22/24 0655)  Respirations: 20 (11/22/24 0655)  Height: 5' 4\" (162.6 cm) (11/22/24 0655)  Weight - Scale: 88.9 kg (195 lb 15.8 oz) (11/22/24 0655)  SpO2: 98 % (11/22/24 0655)    No intake or output data in the 24 hours ending 11/22/24 0733    Invasive Devices       Peripheral Intravenous Line  Duration             Peripheral IV 11/22/24 Right;Dorsal (posterior) Hand <1 day                    Physical Exam  Constitutional:       Appearance: Normal appearance.   HENT:      Head: Normocephalic and atraumatic.      Nose: Nose normal.   Eyes:      General: No scleral icterus.     Conjunctiva/sclera: Conjunctivae normal.   Cardiovascular:      Rate and Rhythm: Normal rate  Pulmonary:      Effort: Pulmonary effort is normal.   Abdominal:      General: There is no distension.   Musculoskeletal:         General: No signs of injury.   Skin:     General: Skin is warm.      Coloration: Skin is not jaundiced. Large skin tag on the left posterior thigh which is pedunculated and has a base roughly 2 x 1 cm   Neurological:      General: No " focal deficit present.      Mental Status: Patient is alert and oriented to person, place, and time.   Psychiatric:         Mood and Affect: Mood normal.         Behavior: Behavior normal.    Lab Results: I have personally reviewed pertinent lab results.    No results found for this or any previous visit (from the past 36 hours).

## 2024-11-22 NOTE — DISCHARGE INSTR - AVS FIRST PAGE
Your incisions are covered with Steri-Strips, 4 x 4 gauze, Tegaderm.  In 2 days you may remove your dressing, the Steri-Strips will remain on your skin but the 4 x 4 gauze and Tegaderm can be removed.  The Steri-Strips will fall off on their own.  After your dressings are removed you may shower.  Do not soak your incisions including tub baths or swimming.  You may shower and let water and soap wash over incisions. Do not scrub your incisions.  You may resume your normal diet as tolerated.  You may take Tylenol over-the-counter as needed for pain, follow instructions on the bottle.  You may take Ibuprofen over-the-counter as needed for pain, follow instructions on the bottle.  You may alternate Tylenol and Ibuprofen if needed, but do not take at the same time.  Follow-up with your Surgeon in 2 weeks, call the office for an appointment.  You will receive a survey via Email in regards to your same day surgery experience. Please fill out the survey to let us know how we did with your care.

## 2024-11-27 PROCEDURE — 88304 TISSUE EXAM BY PATHOLOGIST: CPT | Performed by: STUDENT IN AN ORGANIZED HEALTH CARE EDUCATION/TRAINING PROGRAM

## 2024-12-10 ENCOUNTER — OFFICE VISIT (OUTPATIENT)
Dept: SURGERY | Facility: CLINIC | Age: 44
End: 2024-12-10

## 2024-12-10 VITALS
RESPIRATION RATE: 18 BRPM | DIASTOLIC BLOOD PRESSURE: 68 MMHG | TEMPERATURE: 98.2 F | OXYGEN SATURATION: 97 % | HEART RATE: 95 BPM | WEIGHT: 195.8 LBS | SYSTOLIC BLOOD PRESSURE: 122 MMHG | BODY MASS INDEX: 33.43 KG/M2 | HEIGHT: 64 IN

## 2024-12-10 DIAGNOSIS — L98.9 BENIGN SKIN GROWTH: Primary | ICD-10-CM

## 2024-12-10 PROCEDURE — 99024 POSTOP FOLLOW-UP VISIT: CPT | Performed by: STUDENT IN AN ORGANIZED HEALTH CARE EDUCATION/TRAINING PROGRAM

## 2024-12-10 NOTE — PATIENT INSTRUCTIONS
Prior Authorization Retail Medication Request    Medication/Dose: dapsone 5 % topical gel   ICD code (if different than what is on RX):  Acne vulgaris [L70.0]  Previously Tried and Failed:    Rationale:      Insurance Name:  [employer]  Insurance ID:  [id number]   THANK YOU for your confidence in our team.     Our Patients Are Important.   We want to improve, and you can help.   You may receive a survey asking you about your visit.   We would appreciate if you would take a few moments to let us know how we are doing.

## 2024-12-10 NOTE — ASSESSMENT & PLAN NOTE
"44-year-old female status post excision of left lower extremity mass on 11/22/2024  -Patient denies any pain  -Pathology report reviewed  -Incision is healing well without erythema induration or drainage  -Follow-up in office as needed    Final Diagnosis   A. Skin and subcutaneous tissue, \"left leg mass\"; excision:       - Lipofibroma       - Negative for malignancy     I reviewed the pathology report and discussed the findings with the patient and their accompanying family or caregiver, if present. All questions were answered to satisfaction and the patient along with family or caregiver, if present, voiced understanding.         "

## 2024-12-10 NOTE — PROGRESS NOTES
"Name: Faustina Mosqueda      : 1980      MRN: [id number]  Encounter Provider: Agustín Eubanks DO  Encounter Date: 12/10/2024   Encounter department: [facility] [place]  :  Assessment & Plan  Benign skin growth  44-year-old female status post excision of left lower extremity mass on 2024  -Patient denies any pain  -Pathology report reviewed  -Incision is healing well without erythema induration or drainage  -Follow-up in office as needed    Final Diagnosis   A. Skin and subcutaneous tissue, \"left leg mass\"; excision:       - Lipofibroma       - Negative for malignancy     I reviewed the pathology report and discussed the findings with the patient and their accompanying family or caregiver, if present. All questions were answered to satisfaction and the patient along with family or caregiver, if present, voiced understanding.             History of Present Illness   HPI  Faustina Mosqueda is a 44 y.o. female who presents for evaluation status post excision of left lower extremity mass.  She denies any pain.  History obtained from: patient    Review of Systems   Constitutional:  Negative for chills, fatigue and fever.   HENT:  Negative for congestion, hearing loss, rhinorrhea and sore throat.    Eyes:  Negative for pain and discharge.   Respiratory:  Negative for cough, chest tightness and shortness of breath.    Cardiovascular:  Negative for chest pain and palpitations.   Gastrointestinal:  Negative for abdominal pain, constipation, diarrhea, nausea and vomiting.   Endocrine: Negative for cold intolerance and heat intolerance.   Genitourinary:  Negative for difficulty urinating and dysuria.   Musculoskeletal:  Negative for back pain and neck pain.   Skin:  Negative for color change and rash.   Allergic/Immunologic: Negative for environmental allergies and food allergies.   Neurological:  Negative for seizures and headaches.   Hematological:  Does not bruise/bleed easily. "   Psychiatric/Behavioral:  Negative for confusion and hallucinations.      Medical History Reviewed by provider this encounter:  Tobacco  Allergies  Meds  Problems  Med Hx  Surg Hx  Fam Hx     .  Past Medical History   Past Medical History:   Diagnosis Date    Abnormal Pap smear of cervix     Cervical dysplasia     Female infertility     Last Assessed: 12/23/13    GDM (gestational diabetes mellitus), class A1     Last Assessed: 4/15/16    Gestational diabetes mellitus in pregnancy     with all pregnancy. 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    Hypoplasia     breasts    Normal delivery     Obesity     Varicella     had as child    Visual impairment     glasses     Past Surgical History:   Procedure Laterality Date    ADENOIDECTOMY      COLPOSCOPY      HYSTEROSCOPY Bilateral 10/2013    Fallopian tube cannulation    MASS EXCISION Left 11/22/2024    Procedure: EXCISION BIOPSY TISSUE LESION/MASS LOWER EXTREMITY;  Surgeon: Agustín Eubanks DO;  Location: [facility] OR;  Service: General    WISDOM TOOTH EXTRACTION       Family History   Problem Relation Age of Onset    Alcohol abuse Father     Diabetes type II Maternal Uncle         Uncomplicated, Controlled    Heart disease Maternal Grandfather     Liver cancer Paternal Grandfather     Uterine cancer Maternal Grandmother     Alcohol abuse Paternal Aunt     Heart disease Family     Breast cancer Neg Hx     Colon cancer Neg Hx     Ovarian cancer Neg Hx     Cervical cancer Neg Hx       reports that she has never smoked. She has never been exposed to tobacco smoke. She has never used smokeless tobacco. She reports that she does not drink alcohol and does not use drugs.  Current Outpatient Medications on File Prior to Visit   Medication Sig Dispense Refill    famotidine (PEPCID) 20 mg tablet TAKE 1 TABLET BY MOUTH EVERY DAY 90 tablet 1     No current facility-administered medications on file prior to visit.   No Known Allergies   Current Outpatient  "Medications on File Prior to Visit   Medication Sig Dispense Refill    famotidine (PEPCID) 20 mg tablet TAKE 1 TABLET BY MOUTH EVERY DAY 90 tablet 1     No current facility-administered medications on file prior to visit.      Social History     Tobacco Use    Smoking status: Never     Passive exposure: Never    Smokeless tobacco: Never   Vaping Use    Vaping status: Never Used   Substance and Sexual Activity    Alcohol use: No    Drug use: No    Sexual activity: Yes     Partners: Male     Birth control/protection: Male Sterilization        Objective   /68 (BP Location: Left arm, Patient Position: Sitting, Cuff Size: Standard)   Pulse 95   Temp 98.2 °F (36.8 °C)   Resp 18   Ht 5' 4\" (1.626 m)   Wt 88.8 kg (195 lb 12.8 oz)   LMP 11/17/2024   SpO2 97%   BMI 33.61 kg/m²      Physical Exam  Constitutional:       Appearance: Normal appearance.   HENT:      Head: Normocephalic and atraumatic.      Nose: Nose normal.   Eyes:      General: No scleral icterus.     Conjunctiva/sclera: Conjunctivae normal.   Cardiovascular:      Rate and Rhythm: Normal rate.   Pulmonary:      Effort: Pulmonary effort is normal.   Musculoskeletal:         General: No signs of injury.   Skin:     General: Skin is warm.      Coloration: Skin is not jaundiced.      Comments: Left lower extremity incision healing well without erythema induration or drainage   Neurological:      General: No focal deficit present.      Mental Status: She is alert and oriented to person, place, and time.   Psychiatric:         Mood and Affect: Mood normal.         Behavior: Behavior normal.           "

## 2025-05-24 DIAGNOSIS — K21.9 GASTROESOPHAGEAL REFLUX DISEASE WITHOUT ESOPHAGITIS: ICD-10-CM

## 2025-05-25 RX ORDER — FAMOTIDINE 20 MG/1
20 TABLET, FILM COATED ORAL DAILY
Qty: 30 TABLET | Refills: 5 | Status: SHIPPED | OUTPATIENT
Start: 2025-05-25

## (undated) DEVICE — TUBING SUCTION 5MM X 12 FT

## (undated) DEVICE — PAD GROUNDING DUAL ADULT

## (undated) DEVICE — BETHLEHEM UNIVERSAL MINOR GEN: Brand: CARDINAL HEALTH

## (undated) DEVICE — DRAPE EQUIPMENT RF WAND

## (undated) DEVICE — 3M™ STERI-STRIP™ REINFORCED ADHESIVE SKIN CLOSURES, R1546, 1/4 IN X 4 IN (6 MM X 100 MM), 10 STRIPS/ENVELOPE: Brand: 3M™ STERI-STRIP™

## (undated) DEVICE — POOLE SUCTION HANDLE: Brand: CARDINAL HEALTH

## (undated) DEVICE — 3M™ TEGADERM™ CHG DRESSING 25/CARTON 4 CARTONS/CASE 1659: Brand: TEGADERM™

## (undated) DEVICE — SUT MONOCRYL 4-0 PS-2 27 IN Y426H

## (undated) DEVICE — 4-PORT MANIFOLD: Brand: NEPTUNE 2

## (undated) DEVICE — GAUZE SPONGES,USP TYPE VII GAUZE, 12 PLY: Brand: CURITY

## (undated) DEVICE — PLUMEPEN PRO 10FT

## (undated) DEVICE — ANTIBACTERIAL UNDYED BRAIDED (POLYGLACTIN 910), SYNTHETIC ABSORBABLE SUTURE: Brand: COATED VICRYL

## (undated) DEVICE — GLOVE SRG BIOGEL 7

## (undated) DEVICE — GLOVE INDICATOR PI UNDERGLOVE SZ 7 BLUE